# Patient Record
Sex: FEMALE | Race: WHITE | NOT HISPANIC OR LATINO | Employment: OTHER | URBAN - METROPOLITAN AREA
[De-identification: names, ages, dates, MRNs, and addresses within clinical notes are randomized per-mention and may not be internally consistent; named-entity substitution may affect disease eponyms.]

---

## 2024-03-19 ENCOUNTER — HOSPITAL ENCOUNTER (OUTPATIENT)
Facility: MEDICAL CENTER | Age: 84
End: 2024-03-21
Attending: STUDENT IN AN ORGANIZED HEALTH CARE EDUCATION/TRAINING PROGRAM | Admitting: STUDENT IN AN ORGANIZED HEALTH CARE EDUCATION/TRAINING PROGRAM
Payer: MEDICARE

## 2024-03-19 ENCOUNTER — HOSPITAL ENCOUNTER (OUTPATIENT)
Dept: RADIOLOGY | Facility: MEDICAL CENTER | Age: 84
End: 2024-03-19
Payer: MEDICARE

## 2024-03-19 DIAGNOSIS — R47.01 EXPRESSIVE APHASIA: ICD-10-CM

## 2024-03-19 DIAGNOSIS — Z95.0 PACEMAKER: ICD-10-CM

## 2024-03-19 DIAGNOSIS — Z86.59 HISTORY OF DEMENTIA: ICD-10-CM

## 2024-03-19 DIAGNOSIS — I15.9 SECONDARY HYPERTENSION: ICD-10-CM

## 2024-03-19 DIAGNOSIS — I16.0 HYPERTENSIVE URGENCY: ICD-10-CM

## 2024-03-19 LAB — EKG IMPRESSION: NORMAL

## 2024-03-19 PROCEDURE — 85027 COMPLETE CBC AUTOMATED: CPT

## 2024-03-19 PROCEDURE — 36415 COLL VENOUS BLD VENIPUNCTURE: CPT

## 2024-03-19 PROCEDURE — 85007 BL SMEAR W/DIFF WBC COUNT: CPT

## 2024-03-19 PROCEDURE — 93005 ELECTROCARDIOGRAM TRACING: CPT | Performed by: STUDENT IN AN ORGANIZED HEALTH CARE EDUCATION/TRAINING PROGRAM

## 2024-03-19 PROCEDURE — 84443 ASSAY THYROID STIM HORMONE: CPT

## 2024-03-19 PROCEDURE — 99285 EMERGENCY DEPT VISIT HI MDM: CPT

## 2024-03-19 PROCEDURE — 84439 ASSAY OF FREE THYROXINE: CPT

## 2024-03-19 PROCEDURE — 80053 COMPREHEN METABOLIC PANEL: CPT

## 2024-03-19 PROCEDURE — 83036 HEMOGLOBIN GLYCOSYLATED A1C: CPT

## 2024-03-19 ASSESSMENT — PAIN DESCRIPTION - PAIN TYPE
TYPE: ACUTE PAIN
TYPE: ACUTE PAIN

## 2024-03-20 PROBLEM — I10 PRIMARY HYPERTENSION: Status: ACTIVE | Noted: 2024-03-20

## 2024-03-20 PROBLEM — E78.5 DYSLIPIDEMIA: Status: ACTIVE | Noted: 2024-03-20

## 2024-03-20 PROBLEM — R47.01 EXPRESSIVE APHASIA: Status: ACTIVE | Noted: 2024-03-20

## 2024-03-20 PROBLEM — I16.0 HYPERTENSIVE URGENCY: Status: ACTIVE | Noted: 2024-03-20

## 2024-03-20 PROBLEM — E03.9 ACQUIRED HYPOTHYROIDISM: Status: ACTIVE | Noted: 2024-03-20

## 2024-03-20 LAB
ALBUMIN SERPL BCP-MCNC: 3.6 G/DL (ref 3.2–4.9)
ALBUMIN/GLOB SERPL: 1.1 G/DL
ALP SERPL-CCNC: 174 U/L (ref 30–99)
ALT SERPL-CCNC: 14 U/L (ref 2–50)
ANION GAP SERPL CALC-SCNC: 12 MMOL/L (ref 7–16)
AST SERPL-CCNC: 20 U/L (ref 12–45)
BASOPHILS # BLD AUTO: 0 % (ref 0–1.8)
BASOPHILS # BLD: 0 K/UL (ref 0–0.12)
BILIRUB SERPL-MCNC: 0.4 MG/DL (ref 0.1–1.5)
BUN SERPL-MCNC: 24 MG/DL (ref 8–22)
CALCIUM ALBUM COR SERPL-MCNC: 9.8 MG/DL (ref 8.5–10.5)
CALCIUM SERPL-MCNC: 9.5 MG/DL (ref 8.5–10.5)
CHLORIDE SERPL-SCNC: 106 MMOL/L (ref 96–112)
CO2 SERPL-SCNC: 23 MMOL/L (ref 20–33)
CREAT SERPL-MCNC: 0.92 MG/DL (ref 0.5–1.4)
EOSINOPHIL # BLD AUTO: 0.49 K/UL (ref 0–0.51)
EOSINOPHIL NFR BLD: 4.5 % (ref 0–6.9)
ERYTHROCYTE [DISTWIDTH] IN BLOOD BY AUTOMATED COUNT: 48 FL (ref 35.9–50)
EST. AVERAGE GLUCOSE BLD GHB EST-MCNC: 131 MG/DL
GFR SERPLBLD CREATININE-BSD FMLA CKD-EPI: 61 ML/MIN/1.73 M 2
GLOBULIN SER CALC-MCNC: 3.2 G/DL (ref 1.9–3.5)
GLUCOSE BLD STRIP.AUTO-MCNC: 102 MG/DL (ref 65–99)
GLUCOSE SERPL-MCNC: 98 MG/DL (ref 65–99)
HBA1C MFR BLD: 6.2 % (ref 4–5.6)
HCT VFR BLD AUTO: 38.6 % (ref 37–47)
HGB BLD-MCNC: 12.9 G/DL (ref 12–16)
LYMPHOCYTES # BLD AUTO: 2.63 K/UL (ref 1–4.8)
LYMPHOCYTES NFR BLD: 24.1 % (ref 22–41)
MANUAL DIFF BLD: NORMAL
MCH RBC QN AUTO: 32.3 PG (ref 27–33)
MCHC RBC AUTO-ENTMCNC: 33.4 G/DL (ref 32.2–35.5)
MCV RBC AUTO: 96.7 FL (ref 81.4–97.8)
MICROCYTES BLD QL SMEAR: ABNORMAL
MONOCYTES # BLD AUTO: 0.49 K/UL (ref 0–0.85)
MONOCYTES NFR BLD AUTO: 4.5 % (ref 0–13.4)
MORPHOLOGY BLD-IMP: NORMAL
NEUTROPHILS # BLD AUTO: 7.29 K/UL (ref 1.82–7.42)
NEUTROPHILS NFR BLD: 66.9 % (ref 44–72)
NRBC # BLD AUTO: 0 K/UL
NRBC BLD-RTO: 0 /100 WBC (ref 0–0.2)
PLATELET # BLD AUTO: 188 K/UL (ref 164–446)
PLATELET BLD QL SMEAR: NORMAL
PMV BLD AUTO: 11.2 FL (ref 9–12.9)
POTASSIUM SERPL-SCNC: 4 MMOL/L (ref 3.6–5.5)
PROT SERPL-MCNC: 6.8 G/DL (ref 6–8.2)
RBC # BLD AUTO: 3.99 M/UL (ref 4.2–5.4)
RBC BLD AUTO: PRESENT
SODIUM SERPL-SCNC: 141 MMOL/L (ref 135–145)
T4 FREE SERPL-MCNC: 1.51 NG/DL (ref 0.93–1.7)
TSH SERPL DL<=0.005 MIU/L-ACNC: 0.34 UIU/ML (ref 0.38–5.33)
WBC # BLD AUTO: 10.9 K/UL (ref 4.8–10.8)

## 2024-03-20 PROCEDURE — 96376 TX/PRO/DX INJ SAME DRUG ADON: CPT

## 2024-03-20 PROCEDURE — 96375 TX/PRO/DX INJ NEW DRUG ADDON: CPT

## 2024-03-20 PROCEDURE — 700102 HCHG RX REV CODE 250 W/ 637 OVERRIDE(OP): Performed by: STUDENT IN AN ORGANIZED HEALTH CARE EDUCATION/TRAINING PROGRAM

## 2024-03-20 PROCEDURE — 700102 HCHG RX REV CODE 250 W/ 637 OVERRIDE(OP)

## 2024-03-20 PROCEDURE — G0378 HOSPITAL OBSERVATION PER HR: HCPCS

## 2024-03-20 PROCEDURE — 82962 GLUCOSE BLOOD TEST: CPT

## 2024-03-20 PROCEDURE — 302118 SHAMPOO,NO RINSE: Performed by: HOSPITALIST

## 2024-03-20 PROCEDURE — 92610 EVALUATE SWALLOWING FUNCTION: CPT

## 2024-03-20 PROCEDURE — A9270 NON-COVERED ITEM OR SERVICE: HCPCS | Performed by: STUDENT IN AN ORGANIZED HEALTH CARE EDUCATION/TRAINING PROGRAM

## 2024-03-20 PROCEDURE — 95816 EEG AWAKE AND DROWSY: CPT | Mod: 26 | Performed by: PSYCHIATRY & NEUROLOGY

## 2024-03-20 PROCEDURE — 95816 EEG AWAKE AND DROWSY: CPT | Performed by: PSYCHIATRY & NEUROLOGY

## 2024-03-20 PROCEDURE — 96374 THER/PROPH/DIAG INJ IV PUSH: CPT

## 2024-03-20 PROCEDURE — 99223 1ST HOSP IP/OBS HIGH 75: CPT | Performed by: STUDENT IN AN ORGANIZED HEALTH CARE EDUCATION/TRAINING PROGRAM

## 2024-03-20 PROCEDURE — A9270 NON-COVERED ITEM OR SERVICE: HCPCS

## 2024-03-20 PROCEDURE — 700111 HCHG RX REV CODE 636 W/ 250 OVERRIDE (IP): Performed by: STUDENT IN AN ORGANIZED HEALTH CARE EDUCATION/TRAINING PROGRAM

## 2024-03-20 RX ORDER — ATORVASTATIN CALCIUM 20 MG/1
20 TABLET, FILM COATED ORAL DAILY
COMMUNITY

## 2024-03-20 RX ORDER — CARVEDILOL 3.12 MG/1
3.12 TABLET ORAL 2 TIMES DAILY WITH MEALS
Status: ON HOLD | COMMUNITY
End: 2024-03-21

## 2024-03-20 RX ORDER — LEVOTHYROXINE SODIUM 0.1 MG/1
100 TABLET ORAL
Status: DISCONTINUED | OUTPATIENT
Start: 2024-03-20 | End: 2024-03-21 | Stop reason: HOSPADM

## 2024-03-20 RX ORDER — NYSTATIN 100000 [USP'U]/G
1 POWDER TOPICAL 3 TIMES DAILY
COMMUNITY

## 2024-03-20 RX ORDER — LEVOTHYROXINE SODIUM 0.1 MG/1
100 TABLET ORAL
COMMUNITY

## 2024-03-20 RX ORDER — LEVOFLOXACIN 500 MG/1
250 TABLET, FILM COATED ORAL EVERY EVENING
Status: DISCONTINUED | OUTPATIENT
Start: 2024-03-20 | End: 2024-03-21 | Stop reason: HOSPADM

## 2024-03-20 RX ORDER — HYDRALAZINE HYDROCHLORIDE 20 MG/ML
10 INJECTION INTRAMUSCULAR; INTRAVENOUS
Status: COMPLETED | OUTPATIENT
Start: 2024-03-20 | End: 2024-03-20

## 2024-03-20 RX ORDER — PREGABALIN 75 MG/1
75 CAPSULE ORAL 3 TIMES DAILY
COMMUNITY

## 2024-03-20 RX ORDER — OXYBUTYNIN CHLORIDE 5 MG/1
10 TABLET ORAL DAILY
COMMUNITY

## 2024-03-20 RX ORDER — ASPIRIN 300 MG/1
300 SUPPOSITORY RECTAL DAILY
Status: DISCONTINUED | OUTPATIENT
Start: 2024-03-20 | End: 2024-03-21 | Stop reason: HOSPADM

## 2024-03-20 RX ORDER — PANTOPRAZOLE SODIUM 40 MG/1
40 TABLET, DELAYED RELEASE ORAL DAILY
COMMUNITY

## 2024-03-20 RX ORDER — LEVOFLOXACIN 500 MG/1
250 TABLET, FILM COATED ORAL DAILY
COMMUNITY

## 2024-03-20 RX ORDER — ISOSORBIDE MONONITRATE 30 MG/1
30 TABLET, EXTENDED RELEASE ORAL EVERY MORNING
COMMUNITY

## 2024-03-20 RX ORDER — CARVEDILOL 6.25 MG/1
3.12 TABLET ORAL 2 TIMES DAILY WITH MEALS
Status: DISCONTINUED | OUTPATIENT
Start: 2024-03-20 | End: 2024-03-21 | Stop reason: HOSPADM

## 2024-03-20 RX ORDER — ATORVASTATIN CALCIUM 40 MG/1
40 TABLET, FILM COATED ORAL EVERY EVENING
Status: DISCONTINUED | OUTPATIENT
Start: 2024-03-20 | End: 2024-03-21 | Stop reason: HOSPADM

## 2024-03-20 RX ORDER — TRAMADOL HYDROCHLORIDE 50 MG/1
50 TABLET ORAL EVERY 8 HOURS PRN
COMMUNITY

## 2024-03-20 RX ORDER — OMEPRAZOLE 20 MG/1
20 CAPSULE, DELAYED RELEASE ORAL DAILY
Status: DISCONTINUED | OUTPATIENT
Start: 2024-03-20 | End: 2024-03-21 | Stop reason: HOSPADM

## 2024-03-20 RX ORDER — ASPIRIN 81 MG/1
81 TABLET, CHEWABLE ORAL DAILY
COMMUNITY

## 2024-03-20 RX ORDER — NITROGLYCERIN 0.4 MG/1
0.4 TABLET SUBLINGUAL
COMMUNITY

## 2024-03-20 RX ORDER — SODIUM CHLORIDE 9 MG/ML
500 INJECTION, SOLUTION INTRAVENOUS
Status: DISCONTINUED | OUTPATIENT
Start: 2024-03-20 | End: 2024-03-21 | Stop reason: HOSPADM

## 2024-03-20 RX ORDER — LABETALOL HYDROCHLORIDE 5 MG/ML
10 INJECTION, SOLUTION INTRAVENOUS
Status: COMPLETED | OUTPATIENT
Start: 2024-03-20 | End: 2024-03-20

## 2024-03-20 RX ORDER — ASPIRIN 81 MG/1
81 TABLET, CHEWABLE ORAL DAILY
Status: DISCONTINUED | OUTPATIENT
Start: 2024-03-20 | End: 2024-03-21 | Stop reason: HOSPADM

## 2024-03-20 RX ORDER — HALOPERIDOL 5 MG/ML
1 INJECTION INTRAMUSCULAR EVERY 4 HOURS PRN
Status: DISCONTINUED | OUTPATIENT
Start: 2024-03-20 | End: 2024-03-21 | Stop reason: HOSPADM

## 2024-03-20 RX ORDER — PREGABALIN 75 MG/1
75 CAPSULE ORAL 3 TIMES DAILY
Status: DISCONTINUED | OUTPATIENT
Start: 2024-03-20 | End: 2024-03-21 | Stop reason: HOSPADM

## 2024-03-20 RX ADMIN — HALOPERIDOL LACTATE 1 MG: 5 INJECTION, SOLUTION INTRAMUSCULAR at 03:52

## 2024-03-20 RX ADMIN — ATORVASTATIN CALCIUM 40 MG: 40 TABLET, FILM COATED ORAL at 18:14

## 2024-03-20 RX ADMIN — PREGABALIN 75 MG: 75 CAPSULE ORAL at 12:05

## 2024-03-20 RX ADMIN — ASPIRIN 300 MG: 300 SUPPOSITORY RECTAL at 06:26

## 2024-03-20 RX ADMIN — PREGABALIN 75 MG: 75 CAPSULE ORAL at 18:09

## 2024-03-20 RX ADMIN — LEVOFLOXACIN 250 MG: 500 TABLET, FILM COATED ORAL at 18:13

## 2024-03-20 RX ADMIN — LABETALOL HYDROCHLORIDE 10 MG: 5 INJECTION INTRAVENOUS at 16:50

## 2024-03-20 RX ADMIN — HYDRALAZINE HYDROCHLORIDE 10 MG: 20 INJECTION, SOLUTION INTRAMUSCULAR; INTRAVENOUS at 02:25

## 2024-03-20 RX ADMIN — CARVEDILOL 3.12 MG: 6.25 TABLET, FILM COATED ORAL at 18:11

## 2024-03-20 RX ADMIN — LABETALOL HYDROCHLORIDE 10 MG: 5 INJECTION INTRAVENOUS at 04:00

## 2024-03-20 ASSESSMENT — LIFESTYLE VARIABLES
TOTAL SCORE: 0
EVER HAD A DRINK FIRST THING IN THE MORNING TO STEADY YOUR NERVES TO GET RID OF A HANGOVER: NO
ALCOHOL_USE: NO
HAVE YOU EVER FELT YOU SHOULD CUT DOWN ON YOUR DRINKING: NO
CONSUMPTION TOTAL: NEGATIVE
HAVE PEOPLE ANNOYED YOU BY CRITICIZING YOUR DRINKING: NO
ON A TYPICAL DAY WHEN YOU DRINK ALCOHOL HOW MANY DRINKS DO YOU HAVE: 0
DOES PATIENT WANT TO STOP DRINKING: NO
AVERAGE NUMBER OF DAYS PER WEEK YOU HAVE A DRINK CONTAINING ALCOHOL: 0
EVER FELT BAD OR GUILTY ABOUT YOUR DRINKING: NO
HOW MANY TIMES IN THE PAST YEAR HAVE YOU HAD 5 OR MORE DRINKS IN A DAY: 0

## 2024-03-20 ASSESSMENT — PATIENT HEALTH QUESTIONNAIRE - PHQ9
1. LITTLE INTEREST OR PLEASURE IN DOING THINGS: NOT AT ALL
2. FEELING DOWN, DEPRESSED, IRRITABLE, OR HOPELESS: NOT AT ALL
SUM OF ALL RESPONSES TO PHQ9 QUESTIONS 1 AND 2: 0

## 2024-03-20 ASSESSMENT — FIBROSIS 4 INDEX
FIB4 SCORE: 2.39
FIB4 SCORE: 2.39

## 2024-03-20 ASSESSMENT — PAIN DESCRIPTION - PAIN TYPE
TYPE: ACUTE PAIN
TYPE: ACUTE PAIN

## 2024-03-20 NOTE — PROGRESS NOTES
Brief neurology note      84-year-old female with known dementia and nursing home.  Was transferred in outside facility.  Patient had abrupt onset of confusion not responding.  They labeled as expressive aphasia.  However as stated in the ERP's note this has happened multiple times per family.  Therefore differential is broad.  I did not recommend a full stroke workup.  I did recommend may proceed with MRI to evaluate these episodes.  If it does show acute stroke vascular team will do follow-up neurological consult.  If not then general neurology will follow-up with the patient.

## 2024-03-20 NOTE — ED PROVIDER NOTES
CHIEF COMPLAINT  Chief Complaint   Patient presents with    Other     AMS       LIMITATION TO HISTORY   Select:     YANCY Galvan is a 84 y.o. female who presents to the Emergency Department as a transfer from [facility] emergency department for evaluation of episode of aphasia that occurred at her nursing facility patient noted to be aphasic in the emergency department she is recently diagnosed and being treated for urinary tract infection with Macrobid diagnosed 2 days ago at [facility] patient has history of significant dementia family at bedside they report that at this moment she appears to be at her baseline and that her speech seems clear and fluent there would not with her earlier today, symptoms began at 230 neurologist was consulted    OUTSIDE HISTORIAN(S):  Select:    EXTERNAL RECORDS REVIEWED  Select:                 PAST MEDICAL HISTORY  No past medical history on file.  .    SURGICAL HISTORY  No past surgical history on file.      FAMILY HISTORY  No family history on file.       SOCIAL HISTORY  Social History     Socioeconomic History    Marital status:      Spouse name: Not on file    Number of children: Not on file    Years of education: Not on file    Highest education level: Not on file   Occupational History    Not on file   Tobacco Use    Smoking status: Not on file    Smokeless tobacco: Not on file   Substance and Sexual Activity    Alcohol use: Not on file    Drug use: Not on file    Sexual activity: Not on file   Other Topics Concern    Not on file   Social History Narrative    Not on file     Social Determinants of Health     Financial Resource Strain: Not on file   Food Insecurity: Not on file   Transportation Needs: Not on file   Physical Activity: Not on file   Stress: Not on file   Social Connections: Not on file   Intimate Partner Violence: Not on file   Housing Stability: Not on file         CURRENT MEDICATIONS  No current facility-administered medications on file  "prior to encounter.     No current outpatient medications on file prior to encounter.           ALLERGIES  No Known Allergies    PHYSICAL EXAM  VITAL SIGNS:BP (!) 195/81   Pulse 75   Temp 36.6 °C (97.8 °F) (Temporal)   Resp 18   Ht 1.676 m (5' 6\")   Wt 81.2 kg (179 lb)   SpO2 94%   BMI 28.89 kg/m²       GENERAL: Awake and alert  HEAD: Normocephalic and atraumatic  NECK: Normal range of motion, without meningismus  EYES: Pupils Equal, Round, Reactive to Light, extraocular movements intact, conjunctiva white  ENT: Mucous membranes moist, oropharynx clear  PULMONARY: Normal effort, clear to auscultation  CARDIOVASCULAR: No murmurs, clicks or rubs, peripheral pulses 2+  ABDOMINAL: Soft, non-tender, no guarding or rigidity present, no pulsatile masses  BACK: no midline tenderness, no costovertebral tenderness  NEUROLOGICAL: Alert, oriented to person but not place or time, Cranial nerves II-XII intact, strength 5/5 throughout, sensation intact to light touch throughout, speech normal, gait normal, DTR 1+ lower extremities, no extremity dysmetria  EXTREMITIES: No edema, normal to inspection  SKIN: Warm and dry.  PSYCHIATRIC: Affect is appropriate    DIAGNOSTIC STUDIES / PROCEDURES  EKG  EKG Interpretation: I independently reviewed the below EKG and did not see signs of a STEMI  Results for orders placed or performed during the hospital encounter of 24   EKG   Result Value Ref Range    Report       [facility] Emergency Dept.    Test Date:  2024  Pt Name:    AUGUSTIN KNIGHT             Department: ER  MRN:        [id number]                      Room:        15  Gender:     Female                       Technician: [id number]  :        [id number]                   Requested By:GHAZAL ALBERT  Order #:    [id number]                    Reading MD:    Measurements  Intervals                                Axis  Rate:       77                           P:          0  NE:         218             "              QRS:        28  QRSD:       78                           T:          11  QT:         393  QTc:        445    Interpretive Statements  Atrial-ventricular dual-paced complexes  No further rhythm analysis attempted due to paced rhythm  Borderline prolonged NH interval  No previous ECG available for comparison         Cardiac Monitor Interpretation:    Time: 12:40 AM  The cardiac monitor revealed normal sinus rhythm as interpreted by me.  The cardiac monitor was ordered secondary to the patient´s history of CVA/aphasia to monitor the patient for dysrhythmia      LABS  Labs Reviewed   CBC WITH DIFFERENTIAL - Abnormal; Notable for the following components:       Result Value    WBC 10.9 (*)     RBC 3.99 (*)     All other components within normal limits   COMP METABOLIC PANEL - Abnormal; Notable for the following components:    Bun 24 (*)     Alkaline Phosphatase 174 (*)     All other components within normal limits   HEMOGLOBIN A1C - Abnormal; Notable for the following components:    Glycohemoglobin 6.2 (*)     All other components within normal limits   ESTIMATED GFR   DIFFERENTIAL MANUAL   PERIPHERAL SMEAR REVIEW   PLATELET ESTIMATE   MORPHOLOGY         RADIOLOGY  Reviewed CT of the brain obtained at outside facility no intercranial hemorrhage    COURSE & MEDICAL DECISION MAKING    ED COURSE:        INTERVENTIONS BY ME:  Medications   Blood pressure control per admin instructions (has no administration in time range)   labetalol (Normodyne/Trandate) injection 10 mg (has no administration in time range)   hydrALAZINE (Apresoline) injection 10 mg (has no administration in time range)   NS (Bolus) 0.9 % infusion 500 mL (has no administration in time range)   atorvastatin (Lipitor) tablet 40 mg (has no administration in time range)   aspirin (Asa) chewable tab 81 mg (has no administration in time range)     Or   aspirin (Asa) suppository 300 mg (has no administration in time range)       Response on  recheck:      INITIAL ASSESSMENT, COURSE AND PLAN  Care Narrative:     Patient presenting as a transfer evaluation of strokelike symptoms that appear to have resolved patient has history of dementia family including her daughter at bedside reports she is at her neurologic baseline at this time.  She is disoriented but otherwise has no focal neurologic deficits on examination does not exhibit expressive aphasia at this time.  Patient mated to hospitalist for further workup and care.  CBC and CMP are without significant abnormality patient with reassuring vital signs.  Did consider obtaining CTA and CT perfusion of the head and neck given her strokelike symptoms however given her symptoms appear to resolve do not feel the patient has a large vessel occlusion         ADDITIONAL PROBLEM LIST    DISPOSITION AND DISCUSSIONS  Discussion of management with other [facility] or appropriate source(s): None     I have discussed management of the patient with the following physicians and XIMENA's: Spoke to stroke neurologist Dr. Alegre who did not recommend any acute intervention at this time.  I disccused the mangament and decision to admit this patient with the Hospitalist. Dr. Armendariz        FINAL DIAGNOSIS  1. Expressive aphasia    2. Secondary hypertension    3. History of dementia             Electronically signed by: Serge Mcneill DO ,1:47 AM 03/20/24

## 2024-03-20 NOTE — PROGRESS NOTES
Pt remained non-verbal for the duration of our assessment. Pt is unable to be evaluated for orientation or speech aphasia at this time. Pt awake and following motion with eye movement. Physician rounded and was unable to converse with pt.

## 2024-03-20 NOTE — PROGRESS NOTES
Monitor summary: SR 72, MA 0.17, QRS 0.06, QT 0.36, per strip from monitor room.          0300 Patient refusing/continuously removing telemetry monitor

## 2024-03-20 NOTE — PROGRESS NOTES
Family contact, Ana Sanders contacted and stated she will be here before 9:30 to help fill out the MRI screening form, Med Rec., and answer baseline questions.

## 2024-03-20 NOTE — ED TRIAGE NOTES
".  Chief Complaint   Patient presents with    Other     AMS       84 yr patient AARON MATHUR from [facility] after evaluation for a stroke to [facility] for above complaint. PER EMS patient was saw at [place] and alll results were normal. And when asked why the transfer they were told because they could not find anything. Patient has hx of dementia      Pt placed [facility]   Patient and staff wearing appropriate PPE    BP (!) 203/90   Pulse 62   Temp 36.6 °C (97.8 °F) (Temporal)   Resp 18   Ht 1.676 m (5' 6\")   Wt 81.2 kg (179 lb)   SpO2 93%   BMI 28.89 kg/m²     "

## 2024-03-20 NOTE — ASSESSMENT & PLAN NOTE
Systolic BP in the ED up to 200s on presentation.  Continue home carvedilol, as needed antihypertensives ordered  Less likely to be the cause of her neurologic symptoms as her symptoms resolved despite her pressure remaining elevated

## 2024-03-20 NOTE — ASSESSMENT & PLAN NOTE
Expressive aphasia and altered mental status had resolved by the time she arrived to this facility  CT head at outside facility reviewed and negative for acute abnormality  MRI brain ordered. Stroke order set utilized  EDP discussed with neurology recommended stroke workup

## 2024-03-20 NOTE — H&P
Hospital Medicine History & Physical Note    Date of Service  3/20/2024    Primary Care Physician  Pcp Pt States None    Code Status  DNAR/DNI    Chief Complaint  Chief Complaint   Patient presents with    Other     AMS       History of Presenting Illness  Inga Galvan is a 84 y.o. female who was transferred from outside facility 3/19/2024 with expressive aphasia.  History of per chart review as patient has advanced dementia.  Patient sent in to outside facility ED due to expressive aphasia, reportedly per family she is AO x 2-3 and is conversant.  She also was not following commands appropriately.  No history of stroke in the past.  Reportedly her exacerbation is already resolving prior to transfer, on arrival to the this facility family reported her symptoms had resolved.  CT head at outside facility reviewed and shows no acute abnormality.  In the ED afebrile, BP significantly elevated in the 200s systolic.    I discussed the plan of care with patient, bedside RN, and EDP .    Review of Systems  Review of Systems   Unable to perform ROS: Dementia     Past Medical History  Dementia, dyslipidemia, hypertension, hypothyroidism, GERD    Surgical History   has no past surgical history on file.     Family History  Family history reviewed with patient. There is no family history that is pertinent to the chief complaint.     Social History   No current smoking, alcohol use, drug use    Allergies  No Known Allergies    Medications  None       Physical Exam  Temp:  [36.6 °C (97.8 °F)] 36.6 °C (97.8 °F)  Pulse:  [62-75] 75  Resp:  [18] 18  BP: (195-203)/(81-90) 195/81  SpO2:  [93 %-94 %] 94 %  Blood Pressure : (!) 195/81   Temperature: 36.6 °C (97.8 °F)   Pulse: 75   Respiration: 18   Pulse Oximetry: 94 %       Physical Exam  Constitutional:       Appearance: Normal appearance.   HENT:      Head: Normocephalic and atraumatic.      Mouth/Throat:      Mouth: Mucous membranes are moist.      Pharynx: No oropharyngeal  "exudate or posterior oropharyngeal erythema.   Cardiovascular:      Rate and Rhythm: Normal rate and regular rhythm.      Pulses: Normal pulses.      Heart sounds: Normal heart sounds. No murmur heard.  Pulmonary:      Effort: Pulmonary effort is normal. No respiratory distress.      Breath sounds: Normal breath sounds.   Musculoskeletal:         General: No swelling or tenderness. Normal range of motion.   Skin:     General: Skin is warm and dry.   Neurological:      General: No focal deficit present.      Mental Status: She is alert.      Cranial Nerves: No cranial nerve deficit.      Sensory: No sensory deficit.      Comments: Oriented to person and place  Moving all 4 extremities when I walked into the room, then refused to participate in exam         Laboratory:  Recent Labs     03/19/24  2307   WBC 10.9*   RBC 3.99*   HEMOGLOBIN 12.9   HEMATOCRIT 38.6   MCV 96.7   MCH 32.3   MCHC 33.4   RDW 48.0   PLATELETCT 188   MPV 11.2     Recent Labs     03/19/24  2307   SODIUM 141   POTASSIUM 4.0   CHLORIDE 106   CO2 23   GLUCOSE 98   BUN 24*   CREATININE 0.92   CALCIUM 9.5     Recent Labs     03/19/24  2307   ALTSGPT 14   ASTSGOT 20   ALKPHOSPHAT 174*   TBILIRUBIN 0.4   GLUCOSE 98         No results for input(s): \"NTPROBNP\" in the last 72 hours.      No results for input(s): \"TROPONINT\" in the last 72 hours.    Imaging:  MR-BRAIN-W/O    (Results Pending)       EKG:  I have personally reviewed the images and compared with prior images. and My impression is: NSR    Assessment/Plan:  Justification for Admission Status  I anticipate this patient is appropriate for observation status at this time because expressive aphasia, resolved    * Expressive aphasia- (present on admission)  Assessment & Plan  Expressive aphasia and altered mental status had resolved by the time she arrived to this facility  CT head at outside facility reviewed and negative for acute abnormality  MRI brain ordered. Stroke order set utilized  EDP " discussed with neurology recommended stroke workup    Hypertensive urgency- (present on admission)  Assessment & Plan  Systolic BP in the ED up to 200s on presentation.  Continue home carvedilol, as needed antihypertensives ordered  Less likely to be the cause of her neurologic symptoms as her symptoms resolved despite her pressure remaining elevated    Dyslipidemia- (present on admission)  Assessment & Plan  Continue statin, aspirin    Acquired hypothyroidism- (present on admission)  Assessment & Plan  Continue levothyroxine.  Thyroid labs ordered    Primary hypertension- (present on admission)  Assessment & Plan  Continue carvedilol        VTE prophylaxis: SCDs/TEDs

## 2024-03-20 NOTE — PROGRESS NOTES
Patient just admitted    Patient is awake    Advanced dementia, follows commands relatively    Pacer not compatible with mri    Rv lead not capturing/functioning..outpatient f/u with cards.. already made    Would recommend eeg now and ct head at 48 hr mahad    If MRI of the brain is positive for stroke , which will receive clearance from [facility] reconsult neurology

## 2024-03-20 NOTE — THERAPY
Speech Language Pathology   Clinical Swallow Evaluation     Patient Name: Inga Galvan  AGE:  84 y.o., SEX:  female  Medical Record #: [id number]  Date of Service: 3/20/2024      History of Present Illness  The pt is an 83 y/o F who was transferred from [facility] and admitted under observation 3/19/24. The pt presented with AMS and expressive aphasia. Not previously seen by SLP at [facility].     PMHx: dementia    CMHx: hypertensive, hypothyroidism    CT Head [facility] 3/19: No CT evidence of acute intracranial hemorrhage     Pending MRI        General Information:  Vitals  O2 Delivery Device: None - Room Air  Level of Consciousness: Alert, Awake  Orientation: Self, , General place  Follows Directives: Yes - simple commands only      Prior Living Situation & Level of Function:  Prior Services: Intermittent Physical Support for ADL Per Service ([facility])  Housing / Facility: Assisted Living Residence  Lives with - Patient's Self Care Capacity: Attendant / Paid Care Giver  Comments: Per family, pt lives in [facility]. She is not mobile but uses a motorized wheelchair to get around.  Communication: Conversational with known memory deficits d/t dementia. Pt's DIL reported occasional episodes of aphasia and increased confusion r/t UTI.  Swallowing: Pt's son reported that pt consumes softer solids that are cut for her, has difficulty with bread. He denied any known issues with thin liquids; however pt endorsed occasional couging with thin liquids. No concerns with medication administration nor unexpected weight loss, does not drink supplements typically.       Oral Mechanism Evaluation:  Dentition: Full dentures   Facial Symmetry: Equal  Facial Sensation: Equal     Labial Observations: WFL   Lingual Observations: Midline  Motor Speech: WFL          Laryngeal Function:  Secretion Management: Adequate  Voice Quality: Hoarse  Cough: Perceptually weak       Subjective  RN cleared pt for speech tx, no acute changes reported. Pt was awake  "and alert. She was plesant and cooperative. Pt's son and DIL present.      Assessment  Current Method of Nutrition: NPO until cleared by speech pathology  Positioning: Toro's (60-90 degrees)  Bolus Administration: SLP  O2 Delivery Device: None - Room Air  Factor(s) Affecting Performance: None       Swallowing Trials:  Swallowing Trials  Ice: WFL  Thin Liquid (TN0): Impaired - cup, straw  Liquidised (LQ3): WFL  Soft & Bite Sized (SB6): WFL  Regular (RG7): WFL      Comments: SLP administered trials per patient request, stating that she was \"cold\" and wished to remain under her sheets. Appropriate oral acceptance and sufficient containment. Adequate bite. Mastication time of ~13 sec with diced pears and ~23 sec with dinora crackers. Presumed adequate oral manipulation and bolus propulsion. Complete oral clearance. Intermittent subjectively weak cough with thin liquids via cup. Pt reported dinora cracker was \"dry\" and requested water to assist with clearance. This resulted in a subjectively weak cough of thin liquids via straw sip. Vocal quality was intermittently, minimally wet but cleared spontaneously.       Clinical Impressions  The pt presents with clinical signs of dysphagia c/b intermittently weak cough with thin liquids and minimally wet vocal quality, though vocal quality cleared spontaneously. Findings appear likely chronic d/t presbyphagia atop known dementia given patient/family report; however may also be impacted by AMS, which appears to be resolving. Discussed POC options with family, including completing a diagnostic study to fully assess swallow physiology and aspiration risk. Patient/family made an informed decision to forgo additional swallowing workup at this time to best align with GOC and maintain QOL measures. Although aspiration cannot be r/o at bedside, the risk of developing a pulmonary complication from oral intake appears relatively low given the pt's lack of oxygen support, good oral " hygiene and ability to transfer out of bed into her wheelchair.       Recommendations  Diet Consistency: Soft/bite sized (SB6), Thin liquids (TN0) - ok advance solids per patient/family request  Instrumentation: None indicated at this time  Medication: Whole with liquid, As tolerated  Supervision: Assist with meal tray set up  Positioning: Fully upright and midline during oral intake  Risk Management : Small bites/sips  Oral Care: BID      Results and recs d/w pt, family and RN. Please reconsult should the patient/family wish to pursue additional swallowing workup. Thank you.         SLP Treatment Plan  Treatment Plan: None Indicated  SLP Frequency: N/A - Evaluation Only  Estimated Duration: N/A - Evaluation Only    Anticipated Discharge Needs  Therapy Recommendations Upon DC: Patient / Family / Caregiver Education        Carole Frazier, SLP

## 2024-03-20 NOTE — PROGRESS NOTES
Pt A&O x 2. NIH stroke scale completed and pt scored 2. Pt was cooperative and was able to follow all commands. Pt states no pain but is cold. Pt family not at bedside, we are pending daughter-in-law arrival for MRI screening forms and admission profile.

## 2024-03-20 NOTE — PROCEDURES
VIDEO ELECTROENCEPHALOGRAM REPORT      Referring provider: Dr. Dash    DOS: 03/20/24 (total recording of 27 minutes).     INDICATION:  Inga Galvan 84 y.o. female presenting with confusion and aphasia     CURRENT ANTIEPILEPTIC REGIMEN: none     TECHNIQUE: 30 channel video electroencephalogram (EEG) was performed in accordance with the international 10-20 system. The study was reviewed in bipolar and referential montages. The recording examined the patient during   awake and drowsy states    DESCRIPTION OF THE RECORD:  During the wakefulness, the background showed a symmetrical 8 Hz alpha activity posteriorly with amplitude of 70 mV.  There was reactivity to eye closure/opening.  A normal anterior-posterior gradient was noted with faster beta frequencies seen anteriorly.  During drowsiness, increased theta/beta frequencies were seen.    ACTIVATION PROCEDURES:     hyperventilation was not performed    Intermittent Photic stimulation was performed in a stepwise fashion from 1 to 30 Hz and elicited no photic driving response.     ICTAL AND/OR INTERICTAL FINDINGS:   No focal or generalized epileptiform activity noted. No regional slowing was seen during this routine study.  No clinical events or seizures were reported or recorded during the study.     EKG: sampling of the EKG recording demonstrated paced rhythm.     EVENTS: none     INTERPRETATION:    This is a normal video EEG recording in the awake and drowsy states for patient's age group.     Note: A normal EEG does not rule out epilepsy.       Yordy Donaldson MD  Diplomate in Neurology&Epilepsy  Office: [phone]  Fax: [phone]

## 2024-03-20 NOTE — DISCHARGE PLANNING
Advanced  directive faxed to [facility]. Message sent to [facility] with request to scan to Media tab.

## 2024-03-20 NOTE — PROGRESS NOTES
4 Eyes Skin Assessment Completed by MAI Villa and MAI Parsons.    Head WDL  Ears WDL  Nose WDL  Mouth WDL  Neck WDL  Breast/Chest Bruising  Shoulder Blades WDL  Spine WDL  (R) Arm/Elbow/Hand Bruising  (L) Arm/Elbow/Hand Edema  Abdomen Bruising  Groin Redness and Blanching  Scrotum/Coccyx/Buttocks Redness, Blanching, and Discoloration  (R) Leg WDL  (L) Leg WDL  (R) Heel/Foot/Toe Scar  (L) Heel/Foot/Toe WDL          Devices In Places Tele Box, Blood Pressure Cuff, Pulse Ox, and SCD's      Interventions In Place Pillows, Q2 Turns, and Heels Loaded W/Pillows    Possible Skin Injury No    Pictures Uploaded Into Epic N/A  Wound Consult Placed N/A  RN Wound Prevention Protocol Ordered No

## 2024-03-21 ENCOUNTER — APPOINTMENT (OUTPATIENT)
Dept: RADIOLOGY | Facility: MEDICAL CENTER | Age: 84
End: 2024-03-21
Attending: HOSPITALIST
Payer: MEDICARE

## 2024-03-21 VITALS
DIASTOLIC BLOOD PRESSURE: 67 MMHG | HEART RATE: 71 BPM | RESPIRATION RATE: 16 BRPM | BODY MASS INDEX: 30.72 KG/M2 | OXYGEN SATURATION: 92 % | SYSTOLIC BLOOD PRESSURE: 158 MMHG | HEIGHT: 66 IN | WEIGHT: 191.14 LBS | TEMPERATURE: 98.4 F

## 2024-03-21 PROBLEM — R47.01 EXPRESSIVE APHASIA: Status: RESOLVED | Noted: 2024-03-20 | Resolved: 2024-03-21

## 2024-03-21 PROBLEM — I16.0 HYPERTENSIVE URGENCY: Status: RESOLVED | Noted: 2024-03-20 | Resolved: 2024-03-21

## 2024-03-21 LAB
APPEARANCE UR: CLEAR
BACTERIA #/AREA URNS HPF: NEGATIVE /HPF
BILIRUB UR QL STRIP.AUTO: NEGATIVE
CHOLEST SERPL-MCNC: 152 MG/DL (ref 100–199)
COLOR UR: YELLOW
EPI CELLS #/AREA URNS HPF: NEGATIVE /HPF
GLUCOSE UR STRIP.AUTO-MCNC: NEGATIVE MG/DL
HDLC SERPL-MCNC: 35 MG/DL
HYALINE CASTS #/AREA URNS LPF: ABNORMAL /LPF
KETONES UR STRIP.AUTO-MCNC: ABNORMAL MG/DL
LDLC SERPL CALC-MCNC: 94 MG/DL
LEUKOCYTE ESTERASE UR QL STRIP.AUTO: ABNORMAL
MICRO URNS: ABNORMAL
NITRITE UR QL STRIP.AUTO: NEGATIVE
PH UR STRIP.AUTO: 6.5 [PH] (ref 5–8)
PROT UR QL STRIP: NEGATIVE MG/DL
RBC # URNS HPF: ABNORMAL /HPF
RBC UR QL AUTO: NEGATIVE
SP GR UR STRIP.AUTO: 1.02
TRIGL SERPL-MCNC: 114 MG/DL (ref 0–149)
UROBILINOGEN UR STRIP.AUTO-MCNC: 0.2 MG/DL
WBC #/AREA URNS HPF: ABNORMAL /HPF

## 2024-03-21 PROCEDURE — A9270 NON-COVERED ITEM OR SERVICE: HCPCS | Performed by: STUDENT IN AN ORGANIZED HEALTH CARE EDUCATION/TRAINING PROGRAM

## 2024-03-21 PROCEDURE — G0378 HOSPITAL OBSERVATION PER HR: HCPCS

## 2024-03-21 PROCEDURE — 700111 HCHG RX REV CODE 636 W/ 250 OVERRIDE (IP): Mod: JG

## 2024-03-21 PROCEDURE — 96376 TX/PRO/DX INJ SAME DRUG ADON: CPT

## 2024-03-21 PROCEDURE — 70450 CT HEAD/BRAIN W/O DYE: CPT

## 2024-03-21 PROCEDURE — 81001 URINALYSIS AUTO W/SCOPE: CPT

## 2024-03-21 PROCEDURE — 80061 LIPID PANEL: CPT

## 2024-03-21 PROCEDURE — 700102 HCHG RX REV CODE 250 W/ 637 OVERRIDE(OP): Performed by: STUDENT IN AN ORGANIZED HEALTH CARE EDUCATION/TRAINING PROGRAM

## 2024-03-21 PROCEDURE — 99239 HOSP IP/OBS DSCHRG MGMT >30: CPT | Performed by: HOSPITALIST

## 2024-03-21 RX ORDER — LABETALOL HYDROCHLORIDE 5 MG/ML
10 INJECTION, SOLUTION INTRAVENOUS EVERY 4 HOURS PRN
Status: DISCONTINUED | OUTPATIENT
Start: 2024-03-21 | End: 2024-03-21 | Stop reason: HOSPADM

## 2024-03-21 RX ORDER — CARVEDILOL 6.25 MG/1
6.25 TABLET ORAL 2 TIMES DAILY WITH MEALS
Qty: 60 TABLET | Refills: 3 | Status: SHIPPED | OUTPATIENT
Start: 2024-03-21

## 2024-03-21 RX ORDER — HYDRALAZINE HYDROCHLORIDE 20 MG/ML
10 INJECTION INTRAMUSCULAR; INTRAVENOUS EVERY 4 HOURS PRN
Status: DISCONTINUED | OUTPATIENT
Start: 2024-03-21 | End: 2024-03-21 | Stop reason: HOSPADM

## 2024-03-21 RX ADMIN — CARVEDILOL 3.12 MG: 6.25 TABLET, FILM COATED ORAL at 08:38

## 2024-03-21 RX ADMIN — OMEPRAZOLE 20 MG: 20 CAPSULE, DELAYED RELEASE ORAL at 04:35

## 2024-03-21 RX ADMIN — LABETALOL HYDROCHLORIDE 10 MG: 5 INJECTION INTRAVENOUS at 04:37

## 2024-03-21 RX ADMIN — ASPIRIN 81 MG: 81 TABLET, CHEWABLE ORAL at 04:35

## 2024-03-21 RX ADMIN — LEVOTHYROXINE SODIUM 100 MCG: 0.1 TABLET ORAL at 04:35

## 2024-03-21 RX ADMIN — PREGABALIN 75 MG: 75 CAPSULE ORAL at 04:35

## 2024-03-21 ASSESSMENT — PAIN DESCRIPTION - PAIN TYPE: TYPE: ACUTE PAIN

## 2024-03-21 NOTE — PROGRESS NOTES
Patient discharged back to [facility], transported by daughter and son-in-law. IV removed. Monitor removed. Monitor room notified. Discharge instructions gone over with patient and family members, all verbalized understanding. Transported downstairs via WC with Unit Clerk.

## 2024-03-21 NOTE — DISCHARGE PLANNING
Care Transition Team Assessment      Called and spoke with KARON Perez and verified where patient lives. Patient lives at [facility] in [facility] [place]. [address]. Spoke with patients son Cade at bedside. Normally uses Wheelchair @ baseline. [facility] takes care of all meds. Anticipate return to [facility] and family will take patient home @ D/C.      Information Source  Orientation Level: Oriented to place, Oriented to time, Oriented to person  Information Given By: Relative (Son)  Informant's Name: Cade  Who is responsible for making decisions for patient? : Adult child  Name(s) of Primary Decision Maker: Ana BRANTLEY     Interdisciplinary Discharge Planning  Lives with - Patient's Self Care Capacity: Other (Comments) ([facility])  Patient or legal guardian wants to designate a caregiver: Yes  Caregiver name: Pamela Sanders  ([facility]) Authorization for Release of Health Information has been completed: Yes (POA)  Support Systems: Children  Housing / Facility: Assisted Living Residence  Name of Care Facility: [facility]  Do You Take your Prescribed Medications Regularly: Yes  Able to Return to Previous ADL's:  (W/C at baseline.)  Mobility Issues: Yes  Prior Services: Intermittent Physical Support for ADL Per Service ([facility])  Patient Prefers to be Discharged to:: [facility]  Home  Durable Medical Equipment: Other - Specify (Wheel chair)    Discharge Preparedness  What are your discharge supports?: Child  Prior Functional Level: Uses Wheelchair    Functional Assesment  Prior Functional Level: Uses Wheelchair    Finances  Prescription Coverage: Yes    Advance Directive  Advance Directive?:  (Advanced Health care directive)    Discharge Risks or Barriers  Patient risk factors: Vulnerable adult    Anticipated Discharge Information  Discharge Disposition: Discharged to home/self care (01)  Discharge Address: [address] [facility] [place]  Discharge Contact Phone Number: [phone]

## 2024-03-21 NOTE — DISCHARGE SUMMARY
Discharge Summary    CHIEF COMPLAINT ON ADMISSION  Chief Complaint   Patient presents with    Other     AMS       Reason for Admission  Stroke     Admission Date  3/19/2024    CODE STATUS  Prior    HPI & HOSPITAL COURSE  Inga Galvan is a 84 y.o. female who was transferred from outside facility 3/19/2024 with expressive aphasia.  History of per chart review as patient has advanced dementia.  Patient sent in to outside facility ED due to expressive aphasia, reportedly per family she is AO x 2-3 and is conversant.  She also was not following commands appropriately.  No history of stroke in the past.  Reportedly her exacerbation is already resolving prior to transfer, on arrival to the this facility family reported her symptoms had resolved.  CT head at outside facility reviewed and shows no acute abnormality.  In the ED afebrile, BP significantly elevated in the 200s systolic.     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  Patient did have a urinary tract infection and has had a history of having these episodes when she has an infection.  Patient's was already on Levaquin and Levaquin was continued on her hospital stay.  We monitored and titrate her medication for better blood pressure control.  He had an EEG did not show any evidence of any acute abnormality.  She had a repeat CT of the head did not show any acute abnormality.    Patient was cleared for discharge home with the below medication    I spoke to the daughter-in-law over the phone and they will be some ongoing talks about palliative care    Therefore, she is discharged in good and stable condition to home with close outpatient follow-up.    The patient recovered much more quickly than anticipated on admission.    Discharge  Date  3/21/2024    FOLLOW UP ITEMS POST DISCHARGE      DISCHARGE DIAGNOSES  Principal Problem (Resolved):    Expressive aphasia (POA: Yes)  Active Problems:    Primary hypertension (POA: Yes)    Acquired hypothyroidism (POA: Yes)    Dyslipidemia (POA: Yes)  Resolved Problems:    Hypertensive urgency (POA: Yes)      FOLLOW UP  No future appointments.  No follow-up provider specified.    MEDICATIONS ON DISCHARGE     Medication List        CHANGE how you take these medications        Instructions   carvedilol 6.25 MG Tabs  What changed:   medication strength  how much to take  additional instructions  Commonly known as: Coreg   Take 1 Tablet by mouth 2 times a day with meals. Ok for [facility] to dispense medication  Dose: 6.25 mg            CONTINUE taking these medications        Instructions   artificial tears Oint ophth ointment  Commonly known as: Eye Lubricant   Apply 1 Application to both eyes as needed.  Dose: 1 Application     aspirin 81 MG Chew chewable tablet  Commonly known as: Asa   Chew 81 mg every day.  Dose: 81 mg     atorvastatin 20 MG Tabs  Commonly known as: Lipitor   Take 20 mg by mouth every day.  Dose: 20 mg     docusate sodium 50 MG Caps  Commonly known as: Colace   Take 50 mg by mouth 2 times a day as needed for Constipation.  Dose: 50 mg     insulin glargine 100 UNIT/ML Sopn injection  Commonly known as: Lantus   Inject 20 Units under the skin every evening.  Dose: 20 Units     isosorbide mononitrate SR 30 MG Tb24  Commonly known as: Imdur   Take 30 mg by mouth every morning.  Dose: 30 mg     LACTOBACILLUS RHAMNOSUS (GG) PO   Take  by mouth.     levoFLOXacin 500 MG tablet  Commonly known as: Levaquin   Take 250 mg by mouth every day.  Dose: 250 mg     levothyroxine 100 MCG Tabs  Commonly known as: Synthroid   Take 100 mcg by mouth every morning on an empty stomach.  Dose: 100 mcg     nitroglycerin 0.4 MG Subl  Commonly known as: Nitrostat   Place 0.4 mg under the tongue every 5 minutes  as needed for Chest Pain.  Dose: 0.4 mg     nystatin powder  Commonly known as: Mycostatin   Apply 1 Application topically 3 times a day.  Dose: 1 Application     oxybutynin 5 MG Tabs  Commonly known as: Ditropan   Take 10 mg by mouth every day.  Dose: 10 mg     pantoprazole 40 MG Tbec  Commonly known as: Protonix   Take 40 mg by mouth every day.  Dose: 40 mg     pregabalin 75 MG Caps  Commonly known as: Lyrica   Take 75 mg by mouth 3 times a day.  Dose: 75 mg     traMADol 50 MG Tabs  Commonly known as: Ultram   Take 50 mg by mouth every 8 hours as needed for Moderate Pain.  Dose: 50 mg              Allergies  Allergies   Allergen Reactions    Cephalexin     Dilaudid [Hydromorphone]     Diphtheria Toxoid-Containing Vaccines     Latex     Macrodantin [Nitrofurantoin]     Penicillins     Tetanus Toxoids        DIET  No orders of the defined types were placed in this encounter.      ACTIVITY  As tolerated.  Weight bearing as tolerated    CONSULTATIONS      PROCEDURES  Eeg  ct head    LABORATORY  Lab Results   Component Value Date    SODIUM 141 03/19/2024    POTASSIUM 4.0 03/19/2024    CHLORIDE 106 03/19/2024    CO2 23 03/19/2024    GLUCOSE 98 03/19/2024    BUN 24 (H) 03/19/2024    CREATININE 0.92 03/19/2024        Lab Results   Component Value Date    WBC 10.9 (H) 03/19/2024    HEMOGLOBIN 12.9 03/19/2024    HEMATOCRIT 38.6 03/19/2024    PLATELETCT 188 03/19/2024        Total time of the discharge process exceeds 39 minutes.

## 2024-03-21 NOTE — PROGRESS NOTES
Report received from night shift RN. Patient drowsy, attempted to complete assessment and neuro check but patient was too sleepy. Patient able to tell me her name and  but unable to answer other questions because of drowsiness. Vital signs stable. Even and unlabored respirations on RA, patient did not complain of pain when asked. Call light within reach, bed locked and in lowest position, bed alarm on.

## 2024-03-21 NOTE — CARE PLAN
The patient is Stable - Low risk of patient condition declining or worsening    Shift Goals  Clinical Goals: CT Head, neuro checks, BP mgmt  Patient Goals: Go home  Family Goals: N/A    Progress made toward(s) clinical / shift goals:    Problem: Knowledge Deficit - Stroke Education  Goal: Patient's knowledge of stroke and risk factors will improve  Outcome: Progressing     Problem: Neuro Status  Goal: Neuro status will remain stable or improve  Outcome: Progressing       Patient is not progressing towards the following goals:

## 2024-03-21 NOTE — PROGRESS NOTES
Monitor summary: SR and in and out of pacing 72-74, ND 0.20, QRS 0.09, QT 0.40, with frequent PACs per strip from monitor room.

## 2024-03-21 NOTE — PROGRESS NOTES
Pt daughter in law and son spent time today with the pt. Pt daughter-in-law (flavio) is the POA for the pt in [place] and requests the pt be bloodless. POA for [place] did not transfer. Documents for [place] POA in chart. Pt remained disoriented through the day and so bloodless paperwork have not been signed.

## 2024-03-21 NOTE — CARE PLAN
The patient is Stable - Low risk of patient condition declining or worsening    Shift Goals  Clinical Goals: Neuro checks, NIH, transfer to neuro  Patient Goals: rest  Family Goals: EVER    Progress made toward(s) clinical / shift goals:    Problem: Optimal Care of the Stroke Patient  Goal: Optimal emergency care for the stroke patient  Description: Target End Date:  End of day 1    Time of Onset    1.  Time of last known well obtained  2.  Patient and family/caregiver verbalize understanding of diagnosis, medications and testing  3.  NIHSS performed and documented, including date and time, for ischemic stroke patients prior to any acute recanalization therapy (thrombolytics or mechanical) or within 12 hours of arrival if no intervention is warranted  4.  Consults and referrals placed to appropriate departments    Medications Administration as Ordered:    1.  Implement appropriate reversal agents for INR greater than 1.5  2.  Pre-alteplase administration of antihypertensives for SBP >185 DBP >110  3.  Post-alteplase administration of antihypertensives for SBP >185, DBP >105  4.  Thrombolytic Therapy for qualifying ischemic stroke patients who arrive within 4.5 hours of time of Last Known Well. Thrombolytic therapy administered within 30 minutes or a documented reason for delay  Outcome: Progressing  Goal: Optimal acute care for the stroke patient  Description: Target End Date:  1 to 3 days    - Vital signs and neuro checks performed and documented per order  - NIHSS completed and documented per order  - Continuous telemetry monitoring for 72 hours or until discontinued by provider  - Head CT without contrast obtained  - Consideration of MRI/MRA  - MRI screening form completed in worklist if MRI ordered  - Echocardiogram with Bubble Study ordered/completed with consideration of KAYLA  - Carotid Doppler ordered/completed (Not required if CTA of neck completed in ED)  - Lipid Panel obtained within 48 hours of admission  -  PT, PTT, INR obtained per Anticoagulation orders (if applicable)  - Antithrombotic therapy by end of hospital day 2 for ischemic stroke. Provider must document reason if contraindicated.  - Venous Thromboembolism (VTE) Prophylaxis by end of hospital day 2 for ischemic and hemorrhagic stroke. Provider must document reason if contraindicated  - Dysphagia screen completed and documented prior to any PO intake. Patient to remain NPO until Speech Therapy evaluation if thrombolytic or thrombectomy performed  - Rehabilitation assessment including PT/OT/SLP evaluations for referral to Physical Medicine and Rehabilitation services. If none needed, provider needs to document reason  - Neurology consult placed  - Consideration of cardiology consult for cryptogenic strokes  Outcome: Progressing     Problem: Knowledge Deficit - Stroke Education  Goal: Patient's knowledge of stroke and risk factors will improve  Description: Target End Date:  1-3 days or as soon as patient condition allows    Document in Patient Education    1.  Stroke education booklet provided  2.  Education regarding EMS activation, need for follow up, medication prescribed at discharge, risk factors for stroke/lifestyle modifications, warning signs and symptoms of stroke provided  Outcome: Progressing     Problem: Discharge Planning - Stroke  Goal: Ensure Stroke Core Measures are met prior to discharge  Description: Target End Date:  Prior to discharge or change in level of care    1. Patient discharged on antithrombotic therapy (Ischemic Stroke)  2. Patient discharged on intensive statin if LDL is greater than or equal to 70 mg/dl (Ischemic Stroke)  3. Patient discharged on anticoagulation therapy for patients with atrial fibrillation/flutter (Ischemic Stroke)  4. Smoking education/cessation provided if applicable  Outcome: Progressing  Goal: Patient’s continuum of care needs will be met  Description: Target End Date:  Prior to discharge or change in level  of care    1.  Potential discharge barriers identified upon admission and throughout hospital stay  2.  Ensure appropriate referrals in place for follow up with specialists after discharge  3.  Ensure appropriate referrals in place for DMEs if applicable  4.  Collaboration with transitional care team and interdisciplinary team to meet discharge needs  5.  Involve patient and family/caregiver in prioritizing goals for discharge planning  6.  Educate patient and caregiver about discharge instructions, medications, and follow up appointments  7.  Referral placed to [facility]  8.  Assure orders and follow up appointments are made for outpatient extended cardiac monitoring if appropriate  Outcome: Progressing     Problem: Neuro Status  Goal: Neuro status will remain stable or improve  Description: Target End Date:  Prior to discharge or change in level of care    Document on Neuro assessment in the Assessment flowsheet    1.  Assess and monitor neurologic status per provider order/protocol/unit policy  2.  Assess level of consciousness and orientation  3.  Assess for speech, dysarthria, dysphagia, facial symmetry  4.  Assess visual field, eye movements, gaze preference, pupil reaction and size  5.  Assess muscle strength and motor response in all four extremities  6.  Assess for sensation (numbness and tingling)  7.  Assess basic neuro reflexes (cough, gag, corneal)  8.  Identify changes in neuro status and report to provider for testing/treatment orders  Outcome: Progressing     Problem: Hemodynamic Monitoring  Goal: Patient's hemodynamics, fluid balance and neurologic status will be stable or improve  Description: Target End Date:  Prior to discharge or change in level of care    1.  Vital signs, pulse oximetry and cardiac monitor per provider order and/or policy  2.  Frequent pulse checks performed post thrombectomy  3.  Frequent monitoring for signs of bleeding post TPA administration  4.  Proper  management of IV infusions  5.  Intake and output monitored per provider order  6.  Daily weight obtained per unit policy or provider order  7.  Peripheral pulses and capillary refill assessed as needed  8.  Monitor for signs/symptoms of excessive bleeding  9.  Body temperature assessed and fevers treated  10. Patient positioned for maximum circulation/cardiac output  Outcome: Progressing     Problem: Respiratory - Stroke Patient  Goal: Patient will achieve/maintain optimum respiratory rate/effort  Description: Target End Date:  Prior to discharge or change in level of care    Document on Assessment flowsheet    1.  Assess and monitor respiratory rate, rhythm, depth and effort of respiration  2.  Oxygenation assessed throughout shift (recommendation of >94% for new stroke patients)  3.  Oxygen administered and/or titrated per order  4.  Collaboration with RT to administer medication/treatments per order  5.  Patient educated on importance of turning, coughing, and deep breathing  6.  Patient positioned for maximum ventilatory efficiency  7.  Airway suctioning provided as needed  8.  Incentive spirometry encouraged 5-10 times every hour or while awake  Outcome: Progressing     Problem: Dysphagia  Goal: Dysphagia will improve  Description: Target End Date:  Prior to discharge or change in level of care    1.  Assess and monitor ability to swallow  2.  Collaborate with Speech Therapy to determine appropriate adaptation for safe administration of medications and oral nutrition  3.  Elevate head of bed to 90 degrees during feedings and for 30 minutes after each feeding  4.  Encourage proper swallowing techniques  5.  Screening on admission or as soon as possible  Outcome: Progressing     Problem: Risk for Aspiration  Goal: Patient's risk for aspiration will be absent or decrease  Description: Target End Date:  Prior to discharge or change in level of care    1.   Complete dysphagia screening on admission  2.   NPO until  dysphagia screening complete or medically cleared  3.   Collaborate with Speech Therapy, Clinical Dietitian and interdisciplinary team  4.   Implement aspiration precautions  5.   Assist patient up to chair for meals  6.   Elevate head of bed 90 degrees if patient is unable to get out of bed  7.   Encourage small bites  8.   Ensure foods/liquids are of appropriate consistency  9.   Assess for any signs/symptoms of aspiration  10. Assess breath sounds and vital signs after oral intake  Outcome: Progressing     Problem: Urinary Elimination  Goal: Establish and maintain regular urinary output  Description: Target End Date:  Prior to discharge or change in level of care    Document on I/O and Assessment flowsheets    1.  Evaluate need to continue indwelling catheter every shift  2.  Assess signs and symptoms of urinary retention  3.  Assess post-void residual volumes  4.  Implement bladder training program  5.  Encourage scheduled voidings  6.  Assist patient to sit on bedside commode or toilet for voiding  7.  Educate patient and family/caregiver on use and purpose of urine collection devices (document in Patient Education)  Outcome: Progressing     Problem: Bowel Elimination  Goal: Establish and maintain regular bowel function  Description: Target End Date:  Prior to discharge or change in level of care    1.   Note date of last BM  2.   Educate about diet, fluid intake, medication and activity to promote bowel function  3.   Educate signs and symptoms of constipation and interventions to implement  4.   Pharmacologic bowel management per provider order  5.   Regular toileting schedule  6.   Upright position for toileting  7.   High fiber diet  8.   Encourage hydration  9.   Collaborate with Clinical Dietician  10. Care and maintenance of ostomy if applicable  Outcome: Progressing     Problem: Mobility - Stroke  Goal: Patient's capacity to carry out activities will improve  Description: Target End Date:  Prior to  discharge or change in level of care    1.  Assess for barriers to mobility/activity  2.  Implement activity per interdisciplinary team recommendations  3.  Target activity level identified and patient/family/caregiver aware of goal  4.  Provide assistive devices  5.  Instruct patient/caregiver on proper use of assistive/adaptive devices  6.  Schedule activities and rest periods to decrease effects of fatigue  7.  Encourage mobilization to extent of ability  8.  Maintain proper body alignment  9.  Provide adequate pain management to allow progressive mobilization  10. Implement pace maker precautions as needed  Outcome: Progressing     Problem: Knowledge Deficit - Standard  Goal: Patient and family/care givers will demonstrate understanding of plan of care, disease process/condition, diagnostic tests and medications  Description: Target End Date:  1-3 days or as soon as patient condition allows    Document in Patient Education    1.  Patient and family/caregiver oriented to unit, equipment, visitation policy and means for communicating concern  2.  Complete/review Learning Assessment  3.  Assess knowledge level of disease process/condition, treatment plan, diagnostic tests and medications  4.  Explain disease process/condition, treatment plan, diagnostic tests and medications  Outcome: Progressing     Problem: Skin Integrity  Goal: Skin integrity is maintained or improved  Description: Target End Date:  Prior to discharge or change in level of care    Document interventions on Skin Risk/Denis flowsheet groups and corresponding LDA    1.  Assess and monitor skin integrity, appearance and/or temperature  2.  Assess risk factors for impaired skin integrity and/or pressures ulcers  3.  Implement precautions to protect skin integrity in collaboration with interdisciplinary team  4.  Implement pressure ulcer prevention protocol if at risk for skin breakdown  5.  Confirm wound care consult if at risk for skin  breakdown  6.  Ensure patient use of pressure relieving devices  (Low air loss bed, waffle overlay, heel protectors, ROHO cushion, etc)  Outcome: Progressing     Problem: Fall Risk  Goal: Patient will remain free from falls  Description: Target End Date:  Prior to discharge or change in level of care    Document interventions on the [facility] Fall Risk Assessment    1.  Assess for fall risk factors  2.  Implement fall precautions  Outcome: Progressing       Patient is not progressing towards the following goals:      Problem: Psychosocial - Patient Condition  Goal: Patient's ability to verbalize feelings about condition will improve  Description: Target End Date:  Prior to discharge or change in level of care    1.  Discuss coping with medical condition and its effects  2.  Encourage patient participation in care  3.  Encourage acknowledgement of body changes and accompanying emotions  4.  Perform depression screening  Outcome: Not Progressing  Goal: Patient's ability to re-evaluate and adapt role responsibilities will improve  Description: Target End Date:  Prior to discharge or change in level of care    1.  Assess family support  2.  Encourage support system participation in care  3.  Encouraged verbalization of feelings regarding caregiver responsibilities  4.  Discuss changes in role and responsibilities caused by patient's condition  Outcome: Not Progressing

## 2024-03-21 NOTE — PROGRESS NOTES
Patient A x O 1. Patient c/o bed linens being soiled, bed linens changed and barrier powder placed under breasts due to moisture. Purewick replaced. BP increased, pulled medication for high blood pressure, after retaking blood pressure, dose no longer needed and med returned to ADS. Patient bed locked and in lowest position, call light within reach, no further needs at this time.

## 2024-03-21 NOTE — PROGRESS NOTES
"Per day shift report yesterday evening, patient intermittently cooperative with neuro checks/NIH. For this RN and primary RN Niki, patient cooperative and able to follow commands for NIH assessment and Q4 neuro checks for 2000 assessment. However throughout the night, patient uncooperative with midnight and 0400 neuro checks. Patient stated \"how much more of this do I need to do\" and \"stop bugging me.\" Education given on indication for neuro checks and NIH.  Patient able to answer her name and follow commands for turning/repositioning and taking medications but unwilling to participate in neuro checks at midnight and 0400.       Also - discussed with Neri WATSON that patient's BP this /95, recheck 185/79. SHIRLEY ordered PRN blood pressure medication. Administered per orders. Recheck after administration 154/72.   "

## 2024-03-21 NOTE — CARE PLAN
The patient is Watcher - Medium risk of patient condition declining or worsening    Shift Goals  Clinical Goals: MRI  Patient Goals: Rest  Family Goals: Pt to be comfortable    Progress made toward(s) clinical / shift goals:    Problem: Optimal Care of the Stroke Patient  Goal: Optimal emergency care for the stroke patient  Outcome: Progressing  Goal: Optimal acute care for the stroke patient  Outcome: Progressing     Problem: Knowledge Deficit - Stroke Education  Goal: Patient's knowledge of stroke and risk factors will improve  Outcome: Progressing     Problem: Psychosocial - Patient Condition  Goal: Patient's ability to verbalize feelings about condition will improve  Outcome: Progressing  Goal: Patient's ability to re-evaluate and adapt role responsibilities will improve  Outcome: Progressing     Problem: Discharge Planning - Stroke  Goal: Ensure Stroke Core Measures are met prior to discharge  Outcome: Progressing  Goal: Patient’s continuum of care needs will be met  Outcome: Progressing     Problem: Neuro Status  Goal: Neuro status will remain stable or improve  Outcome: Progressing     Problem: Hemodynamic Monitoring  Goal: Patient's hemodynamics, fluid balance and neurologic status will be stable or improve  Outcome: Progressing     Problem: Respiratory - Stroke Patient  Goal: Patient will achieve/maintain optimum respiratory rate/effort  Outcome: Progressing     Problem: Dysphagia  Goal: Dysphagia will improve  Outcome: Progressing     Problem: Risk for Aspiration  Goal: Patient's risk for aspiration will be absent or decrease  Outcome: Progressing     Problem: Urinary Elimination  Goal: Establish and maintain regular urinary output  Outcome: Progressing     Problem: Bowel Elimination  Goal: Establish and maintain regular bowel function  Outcome: Progressing     Problem: Mobility - Stroke  Goal: Spasticity will be prevented or improved  Outcome: Progressing  Goal: Subluxation will be prevented or  improved  Outcome: Progressing     Problem: Self Care  Goal: Patient will have the ability to perform ADLs independently or with assistance (bathe, groom, dress, toilet and feed)  Outcome: Progressing     Problem: Skin Integrity  Goal: Skin integrity is maintained or improved  Outcome: Progressing       Patient is not progressing towards the following goals:      Problem: Knowledge Deficit - Standard  Goal: Patient and family/care givers will demonstrate understanding of plan of care, disease process/condition, diagnostic tests and medications  Outcome: Not Progressing

## 2024-03-28 ENCOUNTER — TELEPHONE (OUTPATIENT)
Dept: HEALTH INFORMATION MANAGEMENT | Facility: OTHER | Age: 84
End: 2024-03-28
Payer: MEDICARE

## 2024-04-18 ENCOUNTER — TELEPHONE (OUTPATIENT)
Dept: SCHEDULING | Facility: IMAGING CENTER | Age: 84
End: 2024-04-18
Payer: MEDICARE

## 2024-07-16 ENCOUNTER — APPOINTMENT (OUTPATIENT)
Dept: NEUROLOGY | Facility: MEDICAL CENTER | Age: 84
End: 2024-07-16
Attending: PSYCHIATRY & NEUROLOGY
Payer: MEDICARE

## 2025-01-01 ENCOUNTER — APPOINTMENT (OUTPATIENT)
Dept: RADIOLOGY | Facility: MEDICAL CENTER | Age: 85
DRG: 536 | End: 2025-01-01
Attending: EMERGENCY MEDICINE
Payer: MEDICARE

## 2025-01-01 ENCOUNTER — HOSPITAL ENCOUNTER (OUTPATIENT)
Dept: RADIOLOGY | Facility: MEDICAL CENTER | Age: 85
End: 2025-01-16

## 2025-01-01 ENCOUNTER — APPOINTMENT (OUTPATIENT)
Dept: RADIOLOGY | Facility: MEDICAL CENTER | Age: 85
DRG: 536 | End: 2025-01-01
Attending: STUDENT IN AN ORGANIZED HEALTH CARE EDUCATION/TRAINING PROGRAM
Payer: MEDICARE

## 2025-01-01 ENCOUNTER — HOSPITAL ENCOUNTER (INPATIENT)
Facility: MEDICAL CENTER | Age: 85
LOS: 3 days | DRG: 536 | End: 2025-01-19
Attending: EMERGENCY MEDICINE | Admitting: STUDENT IN AN ORGANIZED HEALTH CARE EDUCATION/TRAINING PROGRAM
Payer: MEDICARE

## 2025-01-01 ENCOUNTER — ANESTHESIA EVENT (OUTPATIENT)
Dept: SURGERY | Facility: MEDICAL CENTER | Age: 85
DRG: 536 | End: 2025-01-01
Payer: MEDICARE

## 2025-01-01 ENCOUNTER — ANESTHESIA (OUTPATIENT)
Dept: SURGERY | Facility: MEDICAL CENTER | Age: 85
DRG: 536 | End: 2025-01-01
Payer: MEDICARE

## 2025-01-01 VITALS
HEIGHT: 66 IN | OXYGEN SATURATION: 92 % | WEIGHT: 191 LBS | RESPIRATION RATE: 12 BRPM | BODY MASS INDEX: 30.7 KG/M2 | SYSTOLIC BLOOD PRESSURE: 92 MMHG | TEMPERATURE: 97.3 F | DIASTOLIC BLOOD PRESSURE: 30 MMHG | HEART RATE: 45 BPM

## 2025-01-01 DIAGNOSIS — S72.001A CLOSED FRACTURE OF RIGHT HIP, INITIAL ENCOUNTER (HCC): ICD-10-CM

## 2025-01-01 DIAGNOSIS — Z51.5 COMFORT MEASURES ONLY STATUS: ICD-10-CM

## 2025-01-01 LAB
APPEARANCE UR: ABNORMAL
APTT PPP: 31.4 SEC (ref 24.7–36)
BACTERIA #/AREA URNS HPF: ABNORMAL /HPF
BASOPHILS # BLD AUTO: 0.4 % (ref 0–1.8)
BASOPHILS # BLD: 0.05 K/UL (ref 0–0.12)
BILIRUB UR QL STRIP.AUTO: NEGATIVE
CASTS URNS QL MICRO: ABNORMAL /LPF (ref 0–2)
COLOR UR: YELLOW
EKG IMPRESSION: NORMAL
EOSINOPHIL # BLD AUTO: 0.05 K/UL (ref 0–0.51)
EOSINOPHIL NFR BLD: 0.4 % (ref 0–6.9)
EPITHELIAL CELLS 1715: ABNORMAL /HPF (ref 0–5)
ERYTHROCYTE [DISTWIDTH] IN BLOOD BY AUTOMATED COUNT: 51.5 FL (ref 35.9–50)
GLUCOSE BLD STRIP.AUTO-MCNC: 81 MG/DL (ref 65–99)
GLUCOSE UR STRIP.AUTO-MCNC: NEGATIVE MG/DL
HCT VFR BLD AUTO: 35.7 % (ref 37–47)
HGB BLD-MCNC: 11.1 G/DL (ref 12–16)
IMM GRANULOCYTES # BLD AUTO: 0.07 K/UL (ref 0–0.11)
IMM GRANULOCYTES NFR BLD AUTO: 0.5 % (ref 0–0.9)
INR PPP: 1.03 (ref 0.87–1.13)
KETONES UR STRIP.AUTO-MCNC: NEGATIVE MG/DL
LEUKOCYTE ESTERASE UR QL STRIP.AUTO: ABNORMAL
LYMPHOCYTES # BLD AUTO: 3 K/UL (ref 1–4.8)
LYMPHOCYTES NFR BLD: 21.3 % (ref 22–41)
MCH RBC QN AUTO: 32.2 PG (ref 27–33)
MCHC RBC AUTO-ENTMCNC: 31.1 G/DL (ref 32.2–35.5)
MCV RBC AUTO: 103.5 FL (ref 81.4–97.8)
MICRO URNS: ABNORMAL
MONOCYTES # BLD AUTO: 0.87 K/UL (ref 0–0.85)
MONOCYTES NFR BLD AUTO: 6.2 % (ref 0–13.4)
NEUTROPHILS # BLD AUTO: 10.06 K/UL (ref 1.82–7.42)
NEUTROPHILS NFR BLD: 71.2 % (ref 44–72)
NITRITE UR QL STRIP.AUTO: POSITIVE
NRBC # BLD AUTO: 0 K/UL
NRBC BLD-RTO: 0 /100 WBC (ref 0–0.2)
PH UR STRIP.AUTO: 5.5 [PH] (ref 5–8)
PLATELET # BLD AUTO: 101 K/UL (ref 164–446)
PMV BLD AUTO: 12.2 FL (ref 9–12.9)
PROT UR QL STRIP: 30 MG/DL
PROTHROMBIN TIME: 13.5 SEC (ref 12–14.6)
RBC # BLD AUTO: 3.45 M/UL (ref 4.2–5.4)
RBC # URNS HPF: ABNORMAL /HPF (ref 0–2)
RBC UR QL AUTO: ABNORMAL
SP GR UR STRIP.AUTO: 1.01
UROBILINOGEN UR STRIP.AUTO-MCNC: 0.2 EU/DL
WBC # BLD AUTO: 14.1 K/UL (ref 4.8–10.8)
WBC #/AREA URNS HPF: ABNORMAL /HPF

## 2025-01-01 PROCEDURE — 73501 X-RAY EXAM HIP UNI 1 VIEW: CPT | Mod: RT

## 2025-01-01 PROCEDURE — 160048 HCHG OR STATISTICAL LEVEL 1-5: Performed by: STUDENT IN AN ORGANIZED HEALTH CARE EDUCATION/TRAINING PROGRAM

## 2025-01-01 PROCEDURE — 770001 HCHG ROOM/CARE - MED/SURG/GYN PRIV*

## 2025-01-01 PROCEDURE — 700111 HCHG RX REV CODE 636 W/ 250 OVERRIDE (IP): Mod: JZ | Performed by: STUDENT IN AN ORGANIZED HEALTH CARE EDUCATION/TRAINING PROGRAM

## 2025-01-01 PROCEDURE — 303105 HCHG CATHETER EXTRA

## 2025-01-01 PROCEDURE — 700102 HCHG RX REV CODE 250 W/ 637 OVERRIDE(OP): Performed by: STUDENT IN AN ORGANIZED HEALTH CARE EDUCATION/TRAINING PROGRAM

## 2025-01-01 PROCEDURE — 160029 HCHG SURGERY MINUTES - 1ST 30 MINS LEVEL 4: Performed by: STUDENT IN AN ORGANIZED HEALTH CARE EDUCATION/TRAINING PROGRAM

## 2025-01-01 PROCEDURE — A9270 NON-COVERED ITEM OR SERVICE: HCPCS | Performed by: STUDENT IN AN ORGANIZED HEALTH CARE EDUCATION/TRAINING PROGRAM

## 2025-01-01 PROCEDURE — 96376 TX/PRO/DX INJ SAME DRUG ADON: CPT

## 2025-01-01 PROCEDURE — 700111 HCHG RX REV CODE 636 W/ 250 OVERRIDE (IP): Mod: TB | Performed by: ANESTHESIOLOGY

## 2025-01-01 PROCEDURE — 99291 CRITICAL CARE FIRST HOUR: CPT

## 2025-01-01 PROCEDURE — 93005 ELECTROCARDIOGRAM TRACING: CPT | Mod: TC | Performed by: ANESTHESIOLOGY

## 2025-01-01 PROCEDURE — 93005 ELECTROCARDIOGRAM TRACING: CPT | Mod: TC | Performed by: EMERGENCY MEDICINE

## 2025-01-01 PROCEDURE — 99233 SBSQ HOSP IP/OBS HIGH 50: CPT | Performed by: STUDENT IN AN ORGANIZED HEALTH CARE EDUCATION/TRAINING PROGRAM

## 2025-01-01 PROCEDURE — 99497 ADVNCD CARE PLAN 30 MIN: CPT | Mod: 25 | Performed by: STUDENT IN AN ORGANIZED HEALTH CARE EDUCATION/TRAINING PROGRAM

## 2025-01-01 PROCEDURE — 36415 COLL VENOUS BLD VENIPUNCTURE: CPT

## 2025-01-01 PROCEDURE — 160035 HCHG PACU - 1ST 60 MINS PHASE I: Performed by: STUDENT IN AN ORGANIZED HEALTH CARE EDUCATION/TRAINING PROGRAM

## 2025-01-01 PROCEDURE — 96375 TX/PRO/DX INJ NEW DRUG ADDON: CPT

## 2025-01-01 PROCEDURE — 82962 GLUCOSE BLOOD TEST: CPT

## 2025-01-01 PROCEDURE — 85610 PROTHROMBIN TIME: CPT

## 2025-01-01 PROCEDURE — 51702 INSERT TEMP BLADDER CATH: CPT

## 2025-01-01 PROCEDURE — 85025 COMPLETE CBC W/AUTO DIFF WBC: CPT

## 2025-01-01 PROCEDURE — 99223 1ST HOSP IP/OBS HIGH 75: CPT | Mod: 25 | Performed by: STUDENT IN AN ORGANIZED HEALTH CARE EDUCATION/TRAINING PROGRAM

## 2025-01-01 PROCEDURE — 99223 1ST HOSP IP/OBS HIGH 75: CPT | Mod: 57 | Performed by: STUDENT IN AN ORGANIZED HEALTH CARE EDUCATION/TRAINING PROGRAM

## 2025-01-01 PROCEDURE — 96374 THER/PROPH/DIAG INJ IV PUSH: CPT

## 2025-01-01 PROCEDURE — 700111 HCHG RX REV CODE 636 W/ 250 OVERRIDE (IP): Mod: JZ | Performed by: EMERGENCY MEDICINE

## 2025-01-01 PROCEDURE — 99223 1ST HOSP IP/OBS HIGH 75: CPT | Performed by: INTERNAL MEDICINE

## 2025-01-01 PROCEDURE — 85730 THROMBOPLASTIN TIME PARTIAL: CPT

## 2025-01-01 PROCEDURE — 99239 HOSP IP/OBS DSCHRG MGMT >30: CPT | Performed by: STUDENT IN AN ORGANIZED HEALTH CARE EDUCATION/TRAINING PROGRAM

## 2025-01-01 PROCEDURE — 700111 HCHG RX REV CODE 636 W/ 250 OVERRIDE (IP): Mod: JZ

## 2025-01-01 PROCEDURE — 160009 HCHG ANES TIME/MIN: Performed by: STUDENT IN AN ORGANIZED HEALTH CARE EDUCATION/TRAINING PROGRAM

## 2025-01-01 PROCEDURE — 81001 URINALYSIS AUTO W/SCOPE: CPT

## 2025-01-01 PROCEDURE — 160002 HCHG RECOVERY MINUTES (STAT): Performed by: STUDENT IN AN ORGANIZED HEALTH CARE EDUCATION/TRAINING PROGRAM

## 2025-01-01 RX ORDER — SODIUM CHLORIDE, SODIUM LACTATE, POTASSIUM CHLORIDE, CALCIUM CHLORIDE 600; 310; 30; 20 MG/100ML; MG/100ML; MG/100ML; MG/100ML
INJECTION, SOLUTION INTRAVENOUS CONTINUOUS
Status: DISCONTINUED | OUTPATIENT
Start: 2025-01-01 | End: 2025-01-01 | Stop reason: HOSPADM

## 2025-01-01 RX ORDER — ONDANSETRON 4 MG/1
8 TABLET, ORALLY DISINTEGRATING ORAL EVERY 8 HOURS PRN
Status: DISCONTINUED | OUTPATIENT
Start: 2025-01-01 | End: 2025-01-01 | Stop reason: HOSPADM

## 2025-01-01 RX ORDER — ALBUTEROL SULFATE 5 MG/ML
2.5 SOLUTION RESPIRATORY (INHALATION)
Status: DISCONTINUED | OUTPATIENT
Start: 2025-01-01 | End: 2025-01-01 | Stop reason: HOSPADM

## 2025-01-01 RX ORDER — OXYCODONE HYDROCHLORIDE 5 MG/1
5 TABLET ORAL
Status: DISCONTINUED | OUTPATIENT
Start: 2025-01-01 | End: 2025-01-01 | Stop reason: HOSPADM

## 2025-01-01 RX ORDER — ONDANSETRON 2 MG/ML
4 INJECTION INTRAMUSCULAR; INTRAVENOUS
Status: DISCONTINUED | OUTPATIENT
Start: 2025-01-01 | End: 2025-01-01 | Stop reason: HOSPADM

## 2025-01-01 RX ORDER — POLYETHYLENE GLYCOL 3350 17 G/17G
1 POWDER, FOR SOLUTION ORAL DAILY
Status: DISCONTINUED | OUTPATIENT
Start: 2025-01-01 | End: 2025-01-01 | Stop reason: HOSPADM

## 2025-01-01 RX ORDER — PREGABALIN 75 MG/1
75 CAPSULE ORAL 3 TIMES DAILY
Status: DISCONTINUED | OUTPATIENT
Start: 2025-01-01 | End: 2025-01-01

## 2025-01-01 RX ORDER — EPHEDRINE SULFATE 50 MG/ML
5 INJECTION, SOLUTION INTRAVENOUS
Status: DISCONTINUED | OUTPATIENT
Start: 2025-01-01 | End: 2025-01-01 | Stop reason: HOSPADM

## 2025-01-01 RX ORDER — DIPHENHYDRAMINE HYDROCHLORIDE 50 MG/ML
12.5 INJECTION INTRAMUSCULAR; INTRAVENOUS
Status: DISCONTINUED | OUTPATIENT
Start: 2025-01-01 | End: 2025-01-01 | Stop reason: HOSPADM

## 2025-01-01 RX ORDER — MORPHINE SULFATE 4 MG/ML
INJECTION INTRAVENOUS
Status: COMPLETED
Start: 2025-01-01 | End: 2025-01-01

## 2025-01-01 RX ORDER — MORPHINE SULFATE 4 MG/ML
2 INJECTION INTRAVENOUS
Status: DISCONTINUED | OUTPATIENT
Start: 2025-01-01 | End: 2025-01-01 | Stop reason: HOSPADM

## 2025-01-01 RX ORDER — HALOPERIDOL 5 MG/ML
1 INJECTION INTRAMUSCULAR
Status: DISCONTINUED | OUTPATIENT
Start: 2025-01-01 | End: 2025-01-01 | Stop reason: HOSPADM

## 2025-01-01 RX ORDER — ONDANSETRON 2 MG/ML
8 INJECTION INTRAMUSCULAR; INTRAVENOUS EVERY 8 HOURS PRN
Status: DISCONTINUED | OUTPATIENT
Start: 2025-01-01 | End: 2025-01-01 | Stop reason: HOSPADM

## 2025-01-01 RX ORDER — BUPRENORPHINE 10 UG/H
1 PATCH TRANSDERMAL
COMMUNITY
Start: 2024-01-01

## 2025-01-01 RX ORDER — ACETAMINOPHEN 325 MG/1
650 TABLET ORAL EVERY 6 HOURS
Status: DISCONTINUED | OUTPATIENT
Start: 2025-01-01 | End: 2025-01-01 | Stop reason: HOSPADM

## 2025-01-01 RX ORDER — GLYCOPYRROLATE 0.2 MG/ML
INJECTION INTRAMUSCULAR; INTRAVENOUS PRN
Status: DISCONTINUED | OUTPATIENT
Start: 2025-01-01 | End: 2025-01-01 | Stop reason: SURG

## 2025-01-01 RX ORDER — LOPERAMIDE HYDROCHLORIDE 2 MG/1
2 CAPSULE ORAL 4 TIMES DAILY PRN
Status: DISCONTINUED | OUTPATIENT
Start: 2025-01-01 | End: 2025-01-01 | Stop reason: HOSPADM

## 2025-01-01 RX ORDER — ATROPINE SULFATE 10 MG/ML
2 SOLUTION/ DROPS OPHTHALMIC EVERY 4 HOURS PRN
Status: DISCONTINUED | OUTPATIENT
Start: 2025-01-01 | End: 2025-01-01 | Stop reason: HOSPADM

## 2025-01-01 RX ORDER — OXYCODONE HCL 5 MG/5 ML
5 SOLUTION, ORAL ORAL
Status: DISCONTINUED | OUTPATIENT
Start: 2025-01-01 | End: 2025-01-01 | Stop reason: HOSPADM

## 2025-01-01 RX ORDER — AMOXICILLIN 250 MG
2 CAPSULE ORAL EVERY EVENING
Status: DISCONTINUED | OUTPATIENT
Start: 2025-01-01 | End: 2025-01-01 | Stop reason: HOSPADM

## 2025-01-01 RX ORDER — ACETAMINOPHEN 325 MG/1
650 TABLET ORAL EVERY 6 HOURS PRN
Status: DISCONTINUED | OUTPATIENT
Start: 2025-01-21 | End: 2025-01-01 | Stop reason: HOSPADM

## 2025-01-01 RX ORDER — MORPHINE SULFATE 4 MG/ML
2 INJECTION INTRAVENOUS
Status: DISCONTINUED | OUTPATIENT
Start: 2025-01-01 | End: 2025-01-01

## 2025-01-01 RX ORDER — OXYCODONE HYDROCHLORIDE 5 MG/1
5 TABLET ORAL
Status: DISCONTINUED | OUTPATIENT
Start: 2025-01-01 | End: 2025-01-01

## 2025-01-01 RX ORDER — LOPERAMIDE HYDROCHLORIDE 2 MG/1
2-4 TABLET ORAL 4 TIMES DAILY PRN
COMMUNITY

## 2025-01-01 RX ORDER — OXYCODONE HYDROCHLORIDE 5 MG/1
2.5 TABLET ORAL
Status: DISCONTINUED | OUTPATIENT
Start: 2025-01-01 | End: 2025-01-01 | Stop reason: HOSPADM

## 2025-01-01 RX ORDER — PREGABALIN 75 MG/1
75 CAPSULE ORAL 2 TIMES DAILY
Status: DISCONTINUED | OUTPATIENT
Start: 2025-01-01 | End: 2025-01-01 | Stop reason: HOSPADM

## 2025-01-01 RX ORDER — OXYCODONE HYDROCHLORIDE 5 MG/1
2.5 TABLET ORAL
Status: DISCONTINUED | OUTPATIENT
Start: 2025-01-01 | End: 2025-01-01

## 2025-01-01 RX ORDER — OXYCODONE HCL 5 MG/5 ML
2.5 SOLUTION, ORAL ORAL
Status: DISCONTINUED | OUTPATIENT
Start: 2025-01-01 | End: 2025-01-01 | Stop reason: HOSPADM

## 2025-01-01 RX ORDER — IBUPROFEN 200 MG
400 TABLET ORAL
COMMUNITY

## 2025-01-01 RX ADMIN — FENTANYL CITRATE 50 MCG: 50 INJECTION, SOLUTION INTRAMUSCULAR; INTRAVENOUS at 17:46

## 2025-01-01 RX ADMIN — OXYCODONE 5 MG: 5 TABLET ORAL at 15:31

## 2025-01-01 RX ADMIN — MORPHINE SULFATE 2 MG: 4 INJECTION, SOLUTION INTRAMUSCULAR; INTRAVENOUS at 10:54

## 2025-01-01 RX ADMIN — MORPHINE SULFATE 2 MG: 4 INJECTION, SOLUTION INTRAMUSCULAR; INTRAVENOUS at 08:20

## 2025-01-01 RX ADMIN — MORPHINE SULFATE 2 MG: 4 INJECTION, SOLUTION INTRAMUSCULAR; INTRAVENOUS at 15:27

## 2025-01-01 RX ADMIN — MORPHINE SULFATE 2 MG: 4 INJECTION, SOLUTION INTRAMUSCULAR; INTRAVENOUS at 11:07

## 2025-01-01 RX ADMIN — MORPHINE SULFATE 2 MG: 4 INJECTION, SOLUTION INTRAMUSCULAR; INTRAVENOUS at 21:36

## 2025-01-01 RX ADMIN — FENTANYL CITRATE 50 MCG: 50 INJECTION, SOLUTION INTRAMUSCULAR; INTRAVENOUS at 18:32

## 2025-01-01 RX ADMIN — MORPHINE SULFATE 2 MG: 4 INJECTION, SOLUTION INTRAMUSCULAR; INTRAVENOUS at 07:40

## 2025-01-01 RX ADMIN — GLYCOPYRROLATE 0.2 MG: 0.2 INJECTION INTRAMUSCULAR; INTRAVENOUS at 09:58

## 2025-01-01 RX ADMIN — MORPHINE SULFATE 2 MG: 4 INJECTION, SOLUTION INTRAMUSCULAR; INTRAVENOUS at 18:57

## 2025-01-01 RX ADMIN — OXYCODONE 5 MG: 5 TABLET ORAL at 09:09

## 2025-01-01 RX ADMIN — OXYCODONE 5 MG: 5 TABLET ORAL at 04:15

## 2025-01-01 RX ADMIN — OXYCODONE 5 MG: 5 TABLET ORAL at 11:37

## 2025-01-01 ASSESSMENT — PAIN SCALES - PAIN ASSESSMENT IN ADVANCED DEMENTIA (PAINAD)
CONSOLABILITY: DISTRACTED OR REASSURED BY VOICE/TOUCH
FACIALEXPRESSION: FACIAL GRIMACING
BREATHING: OCCASIONAL LABORED BREATHING, SHORT PERIOD OF HYPERVENTILATION
BREATHING: NOISY LABORED BREATHING, LONG PERIODS OF HYPERVENTILATION, CHEYNE-STOKES RESPIRATIONS
TOTALSCORE: 8
FACIALEXPRESSION: SMILING OR INEXPRESSIVE
TOTALSCORE: 7
BODYLANGUAGE: RELAXED
FACIALEXPRESSION: FACIAL GRIMACING
BREATHING: NOISY LABORED BREATHING, LONG PERIODS OF HYPERVENTILATION, CHEYNE-STOKES RESPIRATIONS
CONSOLABILITY: NO NEED TO CONSOLE
BREATHING: OCCASIONAL LABORED BREATHING, SHORT PERIOD OF HYPERVENTILATION
FACIALEXPRESSION: FACIAL GRIMACING
BREATHING: NORMAL
TOTALSCORE: 1
TOTALSCORE: 7
BODYLANGUAGE: RIGID, FISTS CLENCHED, KNEES UP, PUSHING/PULLING AWAY, STRIKES OUT
CONSOLABILITY: NO NEED TO CONSOLE
TOTALSCORE: 10
BODYLANGUAGE: RELAXED
CONSOLABILITY: UNABLE TO CONSOLE, DISTRACT OR REASSURE
BODYLANGUAGE: RELAXED
CONSOLABILITY: UNABLE TO CONSOLE, DISTRACT OR REASSURE
TOTALSCORE: 4
NEGVOCALIZATION: REPEATED TROUBLED CALLING OUT, LOUD MOANING/GROANING, CRYING
TOTALSCORE: 1
BREATHING: NORMAL
BODYLANGUAGE: RIGID, FISTS CLENCHED, KNEES UP, PUSHING/PULLING AWAY, STRIKES OUT
FACIALEXPRESSION: SAD, FRIGHTENED, FROWN
BREATHING: OCCASIONAL LABORED BREATHING, SHORT PERIOD OF HYPERVENTILATION
FACIALEXPRESSION: SMILING OR INEXPRESSIVE
BODYLANGUAGE: RIGID, FISTS CLENCHED, KNEES UP, PUSHING/PULLING AWAY, STRIKES OUT
NEGVOCALIZATION: OCCASIONAL MOAN/GROAN, LOW SPEECH, NEGATIVE/DISAPPROVING QUALITY
BODYLANGUAGE: TENSE, DISTRESSED PACING, FIDGETING
BODYLANGUAGE: RIGID, FISTS CLENCHED, KNEES UP, PUSHING/PULLING AWAY, STRIKES OUT
CONSOLABILITY: UNABLE TO CONSOLE, DISTRACT OR REASSURE
NEGVOCALIZATION: REPEATED TROUBLED CALLING OUT, LOUD MOANING/GROANING, CRYING
BREATHING: OCCASIONAL LABORED BREATHING, SHORT PERIOD OF HYPERVENTILATION
NEGVOCALIZATION: OCCASIONAL MOAN/GROAN, LOW SPEECH, NEGATIVE/DISAPPROVING QUALITY
NEGVOCALIZATION: OCCASIONAL MOAN/GROAN, LOW SPEECH, NEGATIVE/DISAPPROVING QUALITY
BODYLANGUAGE: RIGID, FISTS CLENCHED, KNEES UP, PUSHING/PULLING AWAY, STRIKES OUT
NEGVOCALIZATION: REPEATED TROUBLED CALLING OUT, LOUD MOANING/GROANING, CRYING
BREATHING: OCCASIONAL LABORED BREATHING, SHORT PERIOD OF HYPERVENTILATION
BODYLANGUAGE: RIGID, FISTS CLENCHED, KNEES UP, PUSHING/PULLING AWAY, STRIKES OUT
FACIALEXPRESSION: SMILING OR INEXPRESSIVE
NEGVOCALIZATION: REPEATED TROUBLED CALLING OUT, LOUD MOANING/GROANING, CRYING
NEGVOCALIZATION: REPEATED TROUBLED CALLING OUT, LOUD MOANING/GROANING, CRYING
CONSOLABILITY: NO NEED TO CONSOLE
TOTALSCORE: 7
BODYLANGUAGE: RELAXED
FACIALEXPRESSION: SAD, FRIGHTENED, FROWN
BODYLANGUAGE: RELAXED
TOTALSCORE: 9
CONSOLABILITY: NO NEED TO CONSOLE
FACIALEXPRESSION: FACIAL GRIMACING
FACIALEXPRESSION: FACIAL GRIMACING
BREATHING: OCCASIONAL LABORED BREATHING, SHORT PERIOD OF HYPERVENTILATION
FACIALEXPRESSION: SMILING OR INEXPRESSIVE
TOTALSCORE: 0
BREATHING: OCCASIONAL LABORED BREATHING, SHORT PERIOD OF HYPERVENTILATION
CONSOLABILITY: DISTRACTED OR REASSURED BY VOICE/TOUCH
FACIALEXPRESSION: FACIAL GRIMACING
NEGVOCALIZATION: REPEATED TROUBLED CALLING OUT, LOUD MOANING/GROANING, CRYING
FACIALEXPRESSION: FACIAL GRIMACING
BREATHING: OCCASIONAL LABORED BREATHING, SHORT PERIOD OF HYPERVENTILATION
CONSOLABILITY: UNABLE TO CONSOLE, DISTRACT OR REASSURE
TOTALSCORE: 0
BREATHING: NOISY LABORED BREATHING, LONG PERIODS OF HYPERVENTILATION, CHEYNE-STOKES RESPIRATIONS
TOTALSCORE: 9
CONSOLABILITY: DISTRACTED OR REASSURED BY VOICE/TOUCH
TOTALSCORE: 10
BODYLANGUAGE: RIGID, FISTS CLENCHED, KNEES UP, PUSHING/PULLING AWAY, STRIKES OUT
CONSOLABILITY: DISTRACTED OR REASSURED BY VOICE/TOUCH
CONSOLABILITY: DISTRACTED OR REASSURED BY VOICE/TOUCH

## 2025-01-01 ASSESSMENT — COGNITIVE AND FUNCTIONAL STATUS - GENERAL
WALKING IN HOSPITAL ROOM: TOTAL
DRESSING REGULAR UPPER BODY CLOTHING: TOTAL
MOVING FROM LYING ON BACK TO SITTING ON SIDE OF FLAT BED: A LOT
TOILETING: TOTAL
SUGGESTED CMS G CODE MODIFIER MOBILITY: CM
STANDING UP FROM CHAIR USING ARMS: TOTAL
CLIMB 3 TO 5 STEPS WITH RAILING: TOTAL
MOVING TO AND FROM BED TO CHAIR: TOTAL
DAILY ACTIVITIY SCORE: 9
DRESSING REGULAR LOWER BODY CLOTHING: A LOT
HELP NEEDED FOR BATHING: TOTAL
PERSONAL GROOMING: A LOT
MOBILITY SCORE: 8
SUGGESTED CMS G CODE MODIFIER DAILY ACTIVITY: CL
TURNING FROM BACK TO SIDE WHILE IN FLAT BAD: A LOT
EATING MEALS: A LOT

## 2025-01-01 ASSESSMENT — PAIN DESCRIPTION - PAIN TYPE
TYPE: ACUTE PAIN

## 2025-01-01 ASSESSMENT — FIBROSIS 4 INDEX: FIB4 SCORE: 2.39

## 2025-01-16 PROBLEM — S72.001A CLOSED RIGHT HIP FRACTURE, INITIAL ENCOUNTER (HCC): Status: ACTIVE | Noted: 2025-01-01

## 2025-01-16 PROBLEM — Z71.89 GOALS OF CARE, COUNSELING/DISCUSSION: Status: ACTIVE | Noted: 2025-01-01

## 2025-01-16 PROBLEM — N39.0 URINARY TRACT INFECTION: Status: ACTIVE | Noted: 2025-01-01

## 2025-01-16 PROBLEM — R65.10 SIRS (SYSTEMIC INFLAMMATORY RESPONSE SYNDROME) (HCC): Status: ACTIVE | Noted: 2025-01-01

## 2025-01-16 PROBLEM — F03.90 DEMENTIA (HCC): Status: ACTIVE | Noted: 2025-01-01

## 2025-01-16 PROBLEM — R00.1 BRADYCARDIA: Status: ACTIVE | Noted: 2025-01-01

## 2025-01-17 PROBLEM — T82.111A PACEMAKER MALFUNCTION: Status: ACTIVE | Noted: 2025-01-01

## 2025-01-17 PROBLEM — I49.9 DYSRHYTHMIA: Status: ACTIVE | Noted: 2025-01-01

## 2025-01-17 PROBLEM — Z51.5 COMFORT MEASURES ONLY STATUS: Status: ACTIVE | Noted: 2025-01-01

## 2025-01-17 PROBLEM — Z95.0 PACEMAKER: Status: ACTIVE | Noted: 2025-01-01

## 2025-01-17 PROBLEM — J44.9 CHRONIC OBSTRUCTIVE PULMONARY DISEASE (COPD) (HCC): Status: ACTIVE | Noted: 2025-01-01

## 2025-01-17 PROBLEM — I44.2 COMPLETE HEART BLOCK (HCC): Status: ACTIVE | Noted: 2025-01-01

## 2025-01-17 NOTE — ED NOTES
Medication history reviewed via phone with Benhauer Terrence at [facility] ([facility]) [phone].   Med rec is complete  Allergies reviewed.     Patient has not had any outpatient antibiotics in the last 30 days.   Anticoagulants: No    Per Facility Butrans patch was placed yesterday 1/15/25 PM on left sholder, Patient had patch attached to skin when transferred to [facility].    Jordan Durant PhT

## 2025-01-17 NOTE — DISCHARGE PLANNING
Referral Management Team    Hospice referral received via Epic- T will round to obtain choice ASAP  If there are any questions, please contact me directly.

## 2025-01-17 NOTE — ANESTHESIA POSTPROCEDURE EVALUATION
Patient: Inga Galvan    Procedure Summary       Date: 01/17/25 Room / Location: [place] [address] / SURGERY [facility]    Anesthesia Start: 0953 Anesthesia Stop: 1015    Procedure: INSERTION, INTRAMEDULLARY MYKE, FEMUR, PROXIMAL (Right) Diagnosis:     Surgeons: Chiki Charles M.D. Responsible Provider: Mansi Saldivar M.D.    Anesthesia Type: general ASA Status: 3            Final Anesthesia Type: general  Last vitals  BP   Blood Pressure : 130/55    Temp   36.2 °C (97.2 °F)    Pulse   (!) 57   Resp   15    SpO2   91 %      Anesthesia Post Evaluation    Patient location during evaluation: PACU  Patient participation: complete - patient participated  Level of consciousness: awake    Airway patency: patent  Anesthetic complications: no  Respiratory status: acceptable  Comments: Case canceled prior to administration of anesthesia given HR 28 and anticipated intolerance to hemodynamic effects of anesthesics.  Pt will need cardiology evaluation/intervention prior to surgical intervention.              Encounter Notable Events   Notable Event Outcome Phase Comment   Dysrhythmia  Intraprocedure Bradycardia        Nurse Pain Score: 0 (NPRS)

## 2025-01-17 NOTE — CONSULTS
Reason for Consult:  Asked by Dr Dacia Elias M.D. to see this patient with complete hear block pacemaker malfunction  Patient's PCP: Pcp Pt States None    CC:   Chief Complaint   Patient presents with    GLF     Unwitnessed fall at SNF    Hip Pain     Right Hip pain, R femur fx per outside hospital records       HPI: This is 83 yo living at assisted living with Biotronik PPM since 2016 at [place] last checked 3/2024 who presents with fall EKG here show complete heart block and Biotronik pacer interriogation reveals pacer non capture for unknown duration, presumable recently given Cr is normal.  She is sedated and can't answer questions with marked fatigue     Medications / Drug list prior to admission:  No current facility-administered medications on file prior to encounter.     Current Outpatient Medications on File Prior to Encounter   Medication Sig Dispense Refill    buprenorphine (BUTRANS) 10 MCG/HR Place 1 Patch on the skin every 7 days.      pregabalin (LYRICA) 75 MG Cap Take 75 mg by mouth 2 times a day.      ibuprofen (MOTRIN) 200 MG Tab Take 400 mg by mouth 1 time a day as needed for Mild Pain. 2 tablets= 400mg      loperamide (IMODIUM A-D) 2 MG tablet Take 2-4 mg by mouth 4 times a day as needed for Diarrhea.         Current list of administered Medications:    Current Facility-Administered Medications:     MD ALERT...adult comfort care, , Other, PRN, Isabel Chow M.D.    atropine 1 % ophthalmic solution 2 Drop, 2 Drop, Sublingual, Q4HRS PRN, Isabel Chow M.D.    ondansetron (Zofran ODT) dispertab 8 mg, 8 mg, Oral, Q8HRS PRN **OR** ondansetron (Zofran) syringe/vial injection 8 mg, 8 mg, Intravenous, Q8HRS PRN, Isabel Chow M.D.    Pharmacy Consult Request ...Pain Management Review 1 Each, 1 Each, Other, PHARMACY TO DOSE, Isabel Chow M.D.    acetaminophen (Tylenol) tablet 650 mg, 650 mg, Oral, Q6HRS **FOLLOWED BY** [START ON 1/21/2025] acetaminophen (Tylenol) tablet 650 mg, 650 mg, Oral,  Q6HRS PRN, Isabel Chow M.D.    oxyCODONE immediate-release (Roxicodone) tablet 2.5 mg, 2.5 mg, Oral, Q3HRS PRN **OR** oxyCODONE immediate-release (Roxicodone) tablet 5 mg, 5 mg, Oral, Q3HRS PRN **OR** morphine 4 MG/ML injection 2 mg, 2 mg, Intravenous, Q3HRS PRN, Isabel Chow M.D., 2 mg at 01/17/25 1054    pregabalin (Lyrica) capsule 75 mg, 75 mg, Oral, BID, Isabel Chow M.D.    loperamide (Imodium) capsule 2 mg, 2 mg, Oral, 4X/DAY PRN, Isabel Chow M.D.    Past Medical History:   Diagnosis Date    Breast cancer (HCC)     COPD (chronic obstructive pulmonary disease) (HCC)     Dementia (HCC)     Diabetes (HCC)     HTN (hypertension)     Lymphedema     Pacemaker        History reviewed. No pertinent surgical history.    History reviewed. No pertinent family history.  Patient family history was personally reviewed, no pertinent family history to current presentation    Social History     Tobacco Use    Smoking status: Never     Passive exposure: Never    Smokeless tobacco: Never   Vaping Use    Vaping status: Never Used       ALLERGIES:  Allergies   Allergen Reactions    Cephalexin     Dilaudid [Hydromorphone]     Diphtheria Toxoid-Containing Vaccines     Latex     Macrodantin [Nitrofurantoin]     Penicillins     Tetanus Toxoids        Review of systems:  A presentation focused review of symptoms was reviewed with patient. This is reviewed in H&P and PMH. ALL OTHERS reviewed and negative    Physical exam:  Patient Vitals for the past 24 hrs:   BP Temp Temp src Pulse Resp SpO2 Height Weight   01/17/25 1236 139/61 -- -- -- -- -- -- --   01/17/25 1200 -- 36.4 °C (97.5 °F) Temporal (!) 29 13 91 % -- --   01/17/25 1100 -- -- -- (!) 57 -- 91 % -- --   01/17/25 1045 130/55 -- -- (!) 32 15 90 % -- --   01/17/25 1030 (!) 147/57 -- -- (!) 29 15 91 % -- --   01/17/25 1015 125/53 36.2 °C (97.2 °F) Temporal (!) 34 12 99 % -- --   01/17/25 0859 (!) 146/62 36.1 °C (97 °F) Temporal (!) 58 16 99 % -- --   01/17/25 0834 (!)  "154/64 -- -- (!) 28 16 95 % -- --   25 0804 131/66 -- -- (!) 28 19 98 % -- --   25 0740 (!) 154/56 -- -- (!) 29 16 97 % -- --   25 0700 (!) 151/58 -- -- (!) 29 17 95 % -- --   25 0600 (!) 155/57 -- -- (!) 29 20 98 % -- --   25 0500 133/56 -- -- (!) 28 20 95 % -- --   25 0400 115/59 -- -- (!) 30 15 92 % -- --   25 0300 131/72 -- -- (!) 29 20 98 % -- --   25 0200 (!) 148/67 -- -- (!) 29 17 100 % -- --   25 0100 135/57 -- -- (!) 29 15 98 % -- --   25 0000 (!) 142/65 -- -- (!) 29 18 100 % -- --   25 2300 135/87 -- -- (!) 30 16 94 % -- --   25 2200 (!) 168/67 -- -- (!) 29 17 99 % -- --   25 2100 (!) 141/60 36.7 °C (98 °F) -- (!) 33 19 96 % -- --   25 132/61 -- -- (!) 33 20 94 % -- --   25 1902 (!) 140/65 -- -- (!) 30 20 99 % -- --   25 1832 (!) 163/68 -- -- (!) 30 -- 97 % -- --   25 1802 138/62 -- -- 84 -- 95 % -- --   25 1752 (!) 146/79 -- -- 85 -- 92 % -- --   25 1741 137/61 -- -- (!) 31 20 93 % -- --   256 -- -- -- -- -- -- 1.676 m (5' 5.98\") 86.6 kg (191 lb)     General: appears ill lethargic barely aawakens  EYES: no jaundice  HEENT: OP clear   Neck:  No JVD.   CVS:  bradycardic  Resp: Normal respiratory effort,   Abdomen: ND,  Skin: Grossly nothing acute no obvious rashes  Neurological: lethargic Moves all extremities  Extremities:   [  mild edema. No cyanosis.       Data:  Laboratory studies personally reviewed by me:  Recent Results (from the past 24 hours)   EKG (NOW)    Collection Time: 25  5:38 PM   Result Value Ref Range    Report       [facility] Emergency Dept[facility]    Test Date:  2025  Pt Name:    AUGUSTIN KNIGHT             Department:   MRN:        [id number]                      Room:       [facility]  Gender:     Female                       Technician: [id number]  :        1940                   Requested By:GABRIEL SMITH  Order #:    [id number]    "                 Reading MD:    Measurements  Intervals                                Axis  Rate:       63                           P:          0  KY:         0                            QRS:        -73  QRSD:       107                          T:          262  QT:         423  QTc:        434    Interpretive Statements  Atrial fibrillation  Ventricular tachycardia, unsustained  Left axis deviation  Low voltage, extremity and precordial leads  Nonspecific T abnormalities, diffuse leads  Minimal ST elevation, lateral leads  Compared to ECG 03/19/2024 23:58:32  Ventricular tachycardia now present  Left-axis deviation now present  Low QRS voltage  now present  T-wave abnormality now present  ST (T wave) deviation now present  AV dual-paced complex(es) or rhythm no longer present  Ventricular-paced complex(es) or rhythm no longer present     CBC WITH DIFFERENTIAL    Collection Time: 01/16/25  5:50 PM   Result Value Ref Range    WBC 14.1 (H) 4.8 - 10.8 K/uL    RBC 3.45 (L) 4.20 - 5.40 M/uL    Hemoglobin 11.1 (L) 12.0 - 16.0 g/dL    Hematocrit 35.7 (L) 37.0 - 47.0 %    .5 (H) 81.4 - 97.8 fL    MCH 32.2 27.0 - 33.0 pg    MCHC 31.1 (L) 32.2 - 35.5 g/dL    RDW 51.5 (H) 35.9 - 50.0 fL    Platelet Count 101 (L) 164 - 446 K/uL    MPV 12.2 9.0 - 12.9 fL    Neutrophils-Polys 71.20 44.00 - 72.00 %    Lymphocytes 21.30 (L) 22.00 - 41.00 %    Monocytes 6.20 0.00 - 13.40 %    Eosinophils 0.40 0.00 - 6.90 %    Basophils 0.40 0.00 - 1.80 %    Immature Granulocytes 0.50 0.00 - 0.90 %    Nucleated RBC 0.00 0.00 - 0.20 /100 WBC    Neutrophils (Absolute) 10.06 (H) 1.82 - 7.42 K/uL    Lymphs (Absolute) 3.00 1.00 - 4.80 K/uL    Monos (Absolute) 0.87 (H) 0.00 - 0.85 K/uL    Eos (Absolute) 0.05 0.00 - 0.51 K/uL    Baso (Absolute) 0.05 0.00 - 0.12 K/uL    Immature Granulocytes (abs) 0.07 0.00 - 0.11 K/uL    NRBC (Absolute) 0.00 K/uL   PROTHROMBIN TIME (INR)    Collection Time: 01/16/25  5:50 PM   Result Value Ref Range    PT 13.5 12.0  - 14.6 sec    INR 1.03 0.87 - 1.13   APTT    Collection Time: 25  5:50 PM   Result Value Ref Range    APTT 31.4 24.7 - 36.0 sec   URINALYSIS (UA)    Collection Time: 25  6:40 PM    Specimen: Urine   Result Value Ref Range    Color Yellow     Character Cloudy (A)     Specific Gravity 1.014 <1.035    Ph 5.5 5.0 - 8.0    Glucose Negative Negative mg/dL    Ketones Negative Negative mg/dL    Protein 30 (A) Negative mg/dL    Bilirubin Negative Negative    Urobilinogen, Urine 0.2 <=1.0 EU/dL    Nitrite Positive (A) Negative    Leukocyte Esterase Large (A) Negative    Occult Blood Trace (A) Negative    Micro Urine Req Microscopic    URINE MICROSCOPIC (W/UA)    Collection Time: 25  6:40 PM   Result Value Ref Range    WBC 21-50 (A) /hpf    RBC 0-2 0 - 2 /hpf    Bacteria Many (A) None /hpf    Epithelial Cells 3-5 0 - 5 /hpf    Urine Casts 0-2 0 - 2 /lpf   POCT glucose device results    Collection Time: 25  8:24 AM   Result Value Ref Range    POC Glucose, Blood 81 65 - 99 mg/dL   EKG    Collection Time: 25 12:17 PM   Result Value Ref Range    Report       [facility]    Test Date:  2025  Pt Name:    AUGUSTIN KNIGHT             Department: [facility]  MRN:        [id number]                      Room:       [facility]  Gender:     Female                       Technician: [facility]  :        1940                   Requested By:TREVA SERNA  Order #:    [id number]                    Jonas MD: Oziel Alex MD    Measurements  Intervals                                Axis  Rate:       29                           P:          0  DE:         0                            QRS:        48  QRSD:       133                          T:          6  QT:         553  QTc:        384    Interpretive Statements  Pacemaker spikes or artifacts  Junctional rhythm  Right bundle branch block  Baseline wander in lead(s) V2    Electronically Signed On 2025 12:17:49 PST by Oziel lAex MD          Imaging:  [id number]-WITH [id number] VIEW RIGHT   Final Result      Comminuted, displaced and varus angulated intertrochanteric right femur fracture.      CT-OUTSIDE IMAGES-CT CHEST/ABDOMEN/PELVIS   Final Result              EKG tracings personally reviewed by me               All pertinent features of laboratory and imaging reviewed including primary images where applicable      Principal Problem:    Closed right hip fracture, initial encounter (HCC) (POA: Yes)  Active Problems:    Hypertension (POA: Yes)    Dementia (HCC) (POA: Unknown)    Goals of care, counseling/discussion (POA: Unknown)    Bradycardia (POA: Unknown)    SIRS (systemic inflammatory response syndrome) (HCC) (POA: Unknown)    Urinary tract infection (POA: Unknown)    Chronic obstructive pulmonary disease (COPD) (HCC) (POA: Unknown)    Dysrhythmia (POA: Unknown)    Pacemaker (POA: Unknown)    Comfort measures only status (POA: Unknown)    Complete heart block (HCC) (POA: Unknown)    Pacemaker malfunction (POA: Unknown)  Resolved Problems:    * No resolved hospital problems. *      Assessment / Plan:  Pacer noncapture and complete heart block she is symptomatic with lethargy and likely has kidney injury.  Without treatment she is likely to pass soon from this so is appropriate for hospice.  Alternatively she could get a temp wire, micra or upgrade PPM and do hip surgery    Discussed with family and they prefer hospice.  They understand the full ramifications of this decision, if there is a change in status or goals of care please reconsult cardiology      I personally discussed her case with  Dr Dacia Elias M.D.      It is my pleasure to participate in the care of Ms. Galvan.  Please do not hesitate to contact me with questions or concerns.    Harish Lazar MD PhD [facility]  Cardiologist [facility]    1/17/2025    Please note that this dictation was created using voice recognition software. There  may be errors I did not discover before finalizing the note.

## 2025-01-17 NOTE — ED TRIAGE NOTES
Inga Galvan  84 y.o. female  Chief Complaint   Patient presents with    GLF     Unwitnessed fall at     Hip Pain     Right Hip pain, R femur fx per outside hospital records     [facility]. Pt received morphine and norco prior to arrival.     Vitals:    01/16/25 1741   BP: 137/61   Pulse: (!) 31   Resp: 20   SpO2: 93%

## 2025-01-17 NOTE — ASSESSMENT & PLAN NOTE
Unclear etiology, reportedly has pacemaker and suspect some type of intraventricular block.  Per family, pacemaker is not working.  We discussed if they would want any further intervention because it may be possible that the patient could have progressive bradycardia and may arrest; the family expressed understanding of this being possible even before she were to go into surgery in the morning, and they were in agreement with not doing any further workup of this bradycardia at the current time, including EKG.

## 2025-01-17 NOTE — ASSESSMENT & PLAN NOTE
X-ray demonstrates comminuted, displaced and varus angulated intertrochanteric right femur fracture.   Ortho consulted and was suppose to get hip surgery today but patient became bradycardiac and procedure is aborted.   Discussed with son and anya and would like to hold on cardiology and surgery and Son is asking for hospice referral which has been placed. Discussed with  and will provide resource to the family. Hospice evaluation is pending.  Confirmed with son and patient is on comfort measure only status.

## 2025-01-17 NOTE — ASSESSMENT & PLAN NOTE
See SIRS. Suspect UTI vs. Asx bacteriuria, unable to assess in this patient but suspect UTI given SIRS response. No abx per GOC, see GOC

## 2025-01-17 NOTE — ED NOTES
Patient transferred to hospital bed and pillows used for off loading of legs for PU prevention.  Patient resting comfortably, resp even and unlabored, NAD, and no needs at this time.  Fall safety precautions in place per policy.

## 2025-01-17 NOTE — ANESTHESIA TIME REPORT
Anesthesia Start and Stop Event Times       Date Time Event    1/17/2025 0935 Ready for Procedure     0953 Anesthesia Start     1015 Anesthesia Stop          Responsible Staff  01/17/25      Name Role Begin End    Mansi Saldivar M.D. Anesth 0953 1015          Overtime Reason:  no overtime (within assigned shift)    Comments:

## 2025-01-17 NOTE — DISCHARGE PLANNING
Care Transition Team Assessment    RNCM met with patient's son, Cade Sanders, at the [facility] to complete assessment. Cade's spouse, Ana Sanders, is POA.  Pt has DPOA and POLST on file.  Pt has medicare and anthem for medical coverage.  Per Cade, patient has been living at [facility] assisted living [facility] ([address], [place]) for the past year.  Patient has dementia at baseline. Patient was wheelchair bound prior to hospitalization, but she was able to transfer herself.   Per son, patient's monthly income is $3500.  Patient was recently put on comfort care measures---and has only been receiving pain medications at her [facility].  Per son, since patient was unable to have hip surgery, patient will not be able to return to her [facility] in her current state.  Patient's son is interested in hospice.  Per son, [facility] will not go to patient's [facility].  Patient would like to discuss hospice options here locally--possibly group home placement.  RNCM reached out to hospice referral management team, who will meet with Cade today, to discuss options.      Information Source  Orientation Level: Oriented to place, Oriented to situation, Oriented to person  Information Given By: Other (Comments), Relative (Son: Cade Sanders)  Informant's Name: Cade Sanders  Who is responsible for making decisions for patient? : POA  Name(s) of Primary Decision Maker: Ana Sanders    Readmission Evaluation  Is this a readmission?: No    Elopement Risk  Legal Hold: No    Discharge Preparedness  What is your plan after discharge?: Other (comment) (Hospice)  What are your discharge supports?: Child  Prior Functional Level: Needs Assist with Activities of Daily Living, Uses Wheelchair  Difficulity with ADLs:  (Pt was living at an [facility])  Difficulity with IADLs:  (Pt was living at an [facility])    Functional Assesment  Prior Functional Level: Needs Assist with Activities of Daily Living, Uses Wheelchair    Finances  Financial  Barriers to Discharge: No  Prescription Coverage: Yes    Advance Directive  Advance Directive?: DPOA for Health Care, POLST  Durable Power of  Name and Contact : Ana Sanders    Domestic Abuse  Have you ever been the victim of abuse or violence?: No    Psychological Assessment  History of Substance Abuse: None  History of Psychiatric Problems: No (Hx dementia)  Non-compliant with Treatment:  (Hx dementia)  Newly Diagnosed Illness: Yes    Discharge Risks or Barriers  Discharge risks or barriers?: Complex medical needs  Patient risk factors: Cognitive / sensory / physical deficit    Anticipated Discharge Information  Discharge Disposition: D/T to hospice home (50)

## 2025-01-17 NOTE — ANESTHESIA PREPROCEDURE EVALUATION
Case: [id number] Date/Time: 01/17/25 0942    Procedure: INSERTION, INTRAMEDULLARY MYKE, FEMUR, PROXIMAL (Right)    Location: [facility] OR  / SURGERY [facility]    Surgeons: Chiki Charles M.D.            Relevant Problems   PULMONARY   (positive) Chronic obstructive pulmonary disease (COPD) (HCC)      CARDIAC   (positive) Dysrhythmia   (positive) Hypertension   (positive) Pacemaker      ENDO   (positive) Acquired hypothyroidism      Other   (positive) Closed right hip fracture, initial encounter ([facility])   (positive) Dementia ([facility])   Pacemaker dysfunctional per family.    Physical Exam    Airway   Mallampati: II  TM distance: >3 FB  Neck ROM: full       Cardiovascular - normal exam  Rhythm: regular  Rate: abnormal  (-) murmur     Dental - normal exam  (+) upper dentures, lower dentures           Pulmonary - normal exam  Breath sounds clear to auscultation     Abdominal    Neurological - normal exam                   Anesthesia Plan    ASA 3       Plan - general       Airway plan will be ETT          Induction: intravenous    Postoperative Plan: Postoperative administration of opioids is intended.    Pertinent diagnostic labs and testing reviewed    Informed Consent:    Anesthetic plan and risks discussed with patient.    Use of blood products discussed with: patient whom consented to blood products.

## 2025-01-17 NOTE — ED NOTES
Report called to Pre Op RN. Patient scheduled for 1000 surgery and pre op to request transport for around 0900.  Patient sleeping comfortably, resp even and unlabored, NAD, and no needs at this time.  Fall safety precautions in place per policy.

## 2025-01-17 NOTE — PROGRESS NOTES
[facility] Medicine Daily Progress Note    Date of Service  1/17/2025    Chief Complaint  Inga Galvan is a 84 y.o. female admitted 1/16/2025 with right hip fracture.       Hospital Course  No notes on file    84 Y F hx of Dementia, HTN, HLD, Hypothyroidism presented to the hospital for fall with plain film showed right comminuted displaced intertrochanteric fracture of the proximal right femur with varus angulation. Patient was admitted to the hospital on comfort care measure with okay per family to proceed towards hip surgery for comfort measure.       Interval Problem Update  1/17:  Seen and evaluated patient at the bedside. Son is at the bedside.   Son mentioned that patient is on comfort measure at the SNF. She has been getting Butran patches at SNF per son as a pain control.   Plain film in ED showed right comminuted displaced intertrochanteric fracture of the proximal right femur with varus angulation. Patient was suppose to get hip surgery today but patient became bradycardiac with HR's in 20's with EKG concern for 3rd degree block. Procedure is aborted. I spoke with patient son and Una who is the ER physician  in [place] and updated the patient current situation, Family would like a referral for hospice. Son said he wants to hold on surgery and cardiology consult because he states he wants to talk to hospice team for plans of discharge. I placed the hospice and updated the . Patient is currently comfortable, sleeping, bradycardiac with HR's is in 29-58.     Addendum:  Patient son had another discussion with us and now asking for cardiology evaluation and see if patient can be medically optimize from heart standpoint and then get hip repair procedure later for comfort measure. I reached out to cardiology.     Addendum  Patient is transferred to the tele. Patient family mentioned that they do not  wants any heroic life saving measure. They wants to proceed towards the pacemaker  placement but states in the event patient conditions deteriorate okay for the nature to take its course per family. Family don't want drips or ICU level care. Okay per family to monitor patient on tele. Family states that they would like to proceed towards hip repair for comfort and palliative measure  pending cardiac optimization since patient  is in significant amount of pain and has been uncomfortable.   Family stated that it is okay to intubate patient for the procedure purposes only.    I have discussed this patient's plan of care and discharge plan at IDT rounds today with Case Management, Nursing, Nursing leadership, and other members of the IDT team.    Consultants/Specialty  Orthopedics     Code Status  Comfort Care/DNR    Disposition  Not Medically Cleared  I have placed the appropriate orders for post-discharge needs.    Review of Systems  ROS   Unable to obtain due dementia     Physical Exam  Temp:  [36.1 °C (97 °F)-36.7 °C (98 °F)] 36.4 °C (97.5 °F)  Pulse:  [28-85] 29  Resp:  [12-20] 13  BP: (115-168)/(53-87) 139/61  SpO2:  [90 %-100 %] 91 %    Physical Exam  Constitutional:       Appearance: She is ill-appearing.   HENT:      Head: Normocephalic.      Nose: Nose normal.      Mouth/Throat:      Mouth: Mucous membranes are moist.   Cardiovascular:      Rate and Rhythm: Normal rate.   Pulmonary:      Effort: Pulmonary effort is normal.   Abdominal:      General: Abdomen is flat.      Palpations: Abdomen is soft.   Musculoskeletal:         General: Normal range of motion.      Comments: Right hip pain with ROM is limited by pain    Skin:     General: Skin is warm.   Neurological:      General: No focal deficit present.      Mental Status: She is alert. She is disoriented.         Fluids    Intake/Output Summary (Last 24 hours) at 1/17/2025 1321  Last data filed at 1/17/2025 1127  Gross per 24 hour   Intake --   Output 400 ml   Net -400 ml        Laboratory  Recent Labs     01/16/25  1750   WBC 14.1*   RBC  3.45*   HEMOGLOBIN 11.1*   HEMATOCRIT 35.7*   .5*   MCH 32.2   MCHC 31.1*   RDW 51.5*   PLATELETCT 101*   MPV 12.2         Recent Labs     01/16/25  1750   APTT 31.4   INR 1.03               Imaging  DX-HIP-UNILATERAL-WITH PELVIS-1 VIEW RIGHT   Final Result      Comminuted, displaced and varus angulated intertrochanteric right femur fracture.      CT-OUTSIDE IMAGES-CT CHEST/ABDOMEN/PELVIS   Final Result            Assessment/Plan  * Closed right hip fracture, initial encounter ([facility])- (present on admission)  Assessment & Plan  X-ray demonstrates comminuted, displaced and varus angulated intertrochanteric right femur fracture.   Ortho consulted and was suppose to get hip surgery today but patient became bradycardiac and procedure is aborted.   Discussed with son and anya and would like to hold on cardiology and surgery and Son is asking for hospice referral which has been placed. Discussed with  and will provide resource to the family. Hospice evaluation is pending.  Confirmed with son and patient is on comfort measure only status.       Comfort measures only status  Assessment & Plan  Discussed with son and confirmed by the son that patient is on comfort care measures.   On comfort care protocol.     Urinary tract infection  Assessment & Plan  See SIRS. Suspect UTI vs. Asx bacteriuria, unable to assess in this patient but suspect UTI given SIRS response. No abx per [facility], see [facility]    SIRS (systemic inflammatory response syndrome) ([facility])  Assessment & Plan  SIRS criteria identified on my evaluation include:  Tachycardia, with heart rate greater than 90 BPM, Tachypnea, with respirations greater than 20 per minute, and Leukocytosis, with WBC greater than 12,000      Likely in response to right hip fracture and UA with evidence of urinary tract infection.  Noted significant leukocyte Estrace and nitrite on UA obtained by ER provider, family declined chest x-ray to determine if there is any aspiration  pneumonia or other pulmonary infection.  Patient is bradycardic and tachypneic with leukocytosis of 14.1.  However after discussion with the family, antibiotics are not in line with the goals of care for this patient and they would not like proceed with antibiotic therapy at this time.  Will defer to day team to discuss further if family changes their mind on goals of care for this patient.  -comfort care      Bradycardia  Assessment & Plan  Unclear etiology, reportedly has pacemaker and suspect some type of intraventricular block.  Per family, pacemaker is not working.  We discussed if they would want any further intervention because it may be possible that the patient could have progressive bradycardia and may arrest; the family expressed understanding of this being possible even before she were to go into surgery in the morning, and they were in agreement with not doing any further workup of this bradycardia at the current time, including EKG.    Goals of care, counseling/discussion  Assessment & Plan  Discussed goal of care discussion with son and anya would like to proceed toward hospice. They stated to hold on surgery and cardiology consult. Hospice referral has been sent. Evaluation is pending.     Dementia (HCC)  Assessment & Plan  Severe dementia complicated by expressive aphasia. No further treatment based on goals of care. See GOC..    Hypertension- (present on admission)  Assessment & Plan  Likely response to pain from right hip fracture.  Will defer treatment as this is not in line with patient's goals of care.         VTE prophylaxis: comfort care measure only     I have performed a physical exam and reviewed and updated ROS and Plan today (1/17/2025). In review of yesterday's note (1/16/2025), there are no changes except as documented above.    Greater than 51 minutes spent prepping to see patient (e.g. review of tests) obtaining and/or reviewing separately obtained history. Performing a  medically appropriate examination and/ evaluation.  Counseling and educating the patient/family/caregiver.  Ordering medications, tests, or procedures.  Referring and communicating with other health care professionals.  Documenting clinical information in EPIC.  Independently interpreting results and communicating results to patient/family/caregiver.  Care coordination

## 2025-01-17 NOTE — H&P
[facility] Internal Medicine History & Physical Note    Date of Service  1/16/2025    Leif Galvan is a 84 year-old female with advanced dementia complicated by expressive aphasia who presented 1/16/2025 with a fall at her assisted living facility.    The majority of the history is obtained from the patient's son and daughter-in-law as the patient is nonverbal, which is baseline per report the patient's daughter-in-law is her healthcare power of .  The patient has chronic diarrhea of unclear etiology and has regular showers for cleaning at her assisted living facility.  During the shower today, it was reported that the patient had a fall on her right hip for which she was brought to the emergency room.  Imaging in the ER revealed a comminuted, displaced, and varus angulated intertrochanteric right femur fracture. ER provider spoke with Dr. Charles, who is planning to take the patient to the OR at 9 AM on 1/17/2025.  The patient's daughter in law was informed that she should be present for any necessary paperwork early in the morning.    Further discussion was initiated with the patient's daughter-in-law and son regarding the patient's goals of care.  Specifically, it was reported that the patient is comfort care at her outside assisted living facility, and the family, on behalf of the patient, our no longer interested in pursuing any type of medical therapy.  The ER provider reported that the family declined a CT head related to the fall for this patient.  Based on this discussion, we initiated discussion on what treatments the patient would want.  Specifically, the patient's family stated they would not want any further workup for possible infection or antibiotics for treatment.  We discussed that some of the initial laboratory tests indicated that there may be some type of infection, but the family was opposed to treatment at this time as this would not be consistent with the patient's goals  of care given her advanced dementia. They are in line with performing a surgery for the hip fracture with the intention that this may improve the pain and palliate the right hip pain that the patient is experiencing.  Reportedly patient has a pacemaker in place but per the family this is dysfunctional and they have not proceeded with repair as this is not my with patient's goals of care.    A review of systems was performed as above. Past medical history, surgical history, family history, social history, allergies, and medications were reviewed.    Objective  Vitals, labs, and imaging reviewed.    Gen: chronically ll-appearing woman lying in bed on her left side  HEENT: normocephalic, atraumatic   Neck: no tracheal deviation  CV: Bradycardic to the 30s during examination.  Normal rhythm.  Lungs: CTAB  Abd: soft, nontender  Neuro: responsive to voice, reaches out to hold hands with this provider but nonverbal  MSK: Tenderness palpation of the right lateral hip, retracts to pain  Psych: confused affect, pleasant  Skin: warm and dry    Assessment/Plan:  84F with advanced dementia c/b expressive aphasia who presented 1/16/2025 with R comminuted, displaced varus angulated R intertrochanteric femur fracture.    * Closed right hip fracture, initial encounter (HCC)- (present on admission)  Assessment & Plan  X-ray demonstrates comminuted, displaced and varus angulated intertrochanteric right femur fracture.  Ortho following, with plan for OR at 9 AM 1/17/2025.   -pain management, including opiates, ordered  -plan for OR in AM  -NPO at [facility]N  -per discussion with daughter, if were to code in OR would want to remain DNR; will defer further discussion to ortho team    Urinary tract infection  Assessment & Plan  See SIRS. Suspect UTI vs. Asx bacteriuria, unable to assess in this patient but suspect UTI given SIRS response. No abx per GOC, see [facility]    SIRS (systemic inflammatory response syndrome) ([facility])  Assessment & Plan  SIRS  criteria identified on my evaluation include:  Tachycardia, with heart rate greater than 90 BPM, Tachypnea, with respirations greater than 20 per minute, and Leukocytosis, with WBC greater than 12,000      Likely in response to right hip fracture and UA with evidence of urinary tract infection.  Noted significant leukocyte Estrace and nitrite on UA obtained by ER provider, family declined chest x-ray to determine if there is any aspiration pneumonia or other pulmonary infection.  Patient is bradycardic and tachypneic with leukocytosis of 14.1.  However after discussion with the family, antibiotics are not in line with the goals of care for this patient and they would not like proceed with antibiotic therapy at this time.  Will defer to day team to discuss further if family changes their mind on goals of care for this patient.  -comfort care      Bradycardia  Assessment & Plan  Unclear etiology, reportedly has pacemaker and suspect some type of intraventricular block.  Per family, pacemaker is not working.  We discussed if they would want any further intervention because it may be possible that the patient could have progressive bradycardia and may arrest; the family expressed understanding of this being possible even before she were to go into surgery in the morning, and they were in agreement with not doing any further workup of this bradycardia at the current time, including EKG.    Goals of care, counseling/discussion  Assessment & Plan  We restarted the patient's home pregabalin 75 mg 2 times daily, which is the only medication other than opiates and ibuprofen that the patient takes outside of the hospital as a part of a multimodal pain management strategy.  Per the family, they stopped all other medications owing to comfort care.  We had an extensive discussion on goals of care and the family is really only interested in pursuing surgery for the hip fracture as this is acutely causing discomfort for their  mother.  Presented otherwise, they are not interested in any further labs or imaging or even antibiotics to treat a possible infection.  We had a thorough discussion on the difference between CODE STATUS and comfort care.  Specifically, the patient's family said they were only interested in managing discomfort and pain for their mother and would be interested in hospice type care, though this is not available where they are located.  After surgery, the patient's family is interested in going back to the assisted living facility as appropriate.  However, given that the patient is comfort care but does want to proceed with surgery, we discussed that we would do a modified comfort care that would not include heavy use of benzodiazepines or opiates beyond what would be required to control pain specifically.  For this reason, have not ordered scheduled benzodiazepines for agitation or anxiety and will monitor for the signs and treat from a symptom based approach, though this may change overnight as the patient is reportedly often restless and may require some medications but we will use these sparingly and cautiously.  -Consider hospice referral in AM  -Atropine ophthalmic solution as needed  -Zofran as needed  -Converted to comfort care  -Advance diet as tolerated for comfort (will be NPO at [facility] for procedure)  -Will minimize monitoring vitals and defer to nursing per floor guidelines  -holding buprenorphine given opiate need while inpatient    Dementia (HCC)  Assessment & Plan  Severe dementia complicated by expressive aphasia. No further treatment based on goals of care. See GOC..    Hypertension- (present on admission)  Assessment & Plan  Likely response to pain from right hip fracture.  Will defer treatment as this is not in line with patient's goals of care.        The patient's care was discussed with the patient, any present family, and the attending Dr. Stevens. The patient will require at least two  midnights for appropriate medical management, necessitating inpatient admission because of illness severity. The patient will require an inpatient bed on medical service due to hip fracture.    VTE prophylaxis: deferred as comfort care  Diet: ADAT, NPO at [facility] for OR  Code Status: DNR/DNI, comfort  Surrogate Decision Maker: daughter in law Ana  Disposition: pending OR    Isabel Chow MD  Internal Medicine Resident

## 2025-01-17 NOTE — ASSESSMENT & PLAN NOTE
Severe dementia complicated by expressive aphasia. No further treatment based on goals of care. See GOC..

## 2025-01-17 NOTE — ASSESSMENT & PLAN NOTE
Discussed goal of care discussion with son and anya would like to proceed toward hospice. They stated to hold on surgery and cardiology consult. Hospice referral has been sent. Evaluation is pending.

## 2025-01-17 NOTE — OR NURSING
Daughter in law at bedside states top dentures were in last night but no longer in. Possibly in bedding. Patient states they were given to her brother, but patient has dementia and brother has not been in to see patient.    Erythromycin Counseling:  I discussed with the patient the risks of erythromycin including but not limited to GI upset, allergic reaction, drug rash, diarrhea, increase in liver enzymes, and yeast infections.

## 2025-01-17 NOTE — ASSESSMENT & PLAN NOTE
Likely response to pain from right hip fracture.  Will defer treatment as this is not in line with patient's goals of care.

## 2025-01-17 NOTE — ED NOTES
"Bedside report received from off going RN/tech: Claudia, assumed care of patient.  Patient sleeping comfortably. Call light within reach, all needs addressed at this time.       Fall risk interventions in place: Patient's personal possessions are with in their safe reach, Place socks on patient, and Place fall risk sign on patient's door (all applicable per [facility] Fall risk assessment)   Continuous monitoring: Pulse Ox or Blood Pressure  IVF/IV medications: Not Applicable   Oxygen: Room Air  Bedside sitter: Not Applicable   Isolation: Not Applicable    BP (!) 140/65   Pulse (!) 30   Resp 20   Ht 1.676 m (5' 5.98\")   Wt 86.6 kg (191 lb)   SpO2 99%  - RA  "

## 2025-01-17 NOTE — ED PROVIDER NOTES
ED Provider Note  CHIEF COMPLAINT  Chief Complaint   Patient presents with    GLF     Unwitnessed fall at [facility]    Hip Pain     Right Hip pain, R femur fx per outside hospital records       HPI  Inga Galvan is a 84 y.o. female who presents in transfer from [place] after a ground-level fall at her nursing facility.  Patient has a past medical history of fairly severe dementia and is generally A&O x 1.  The fall was unwitnessed and the information available through EMS was scant as the transferring facility did not have much information.  CT of the chest abdomen pelvis was performed at the outside facility which noted a right intertrochanteric hip fracture.  Patient's family members arrived about an hour later and clarified that the patient was being taken to the shower as she has been incontinent of diarrhea recently and she attempted to stand up on her own.  There was no mention of a head strike to the family.  EXTERNAL RECORDS REVIEWED  Reviewed last admission to this facility in March of this year.  ROS  Unable to obtain due to dementia        LIMITATION TO HISTORY   Select: Altered mental status / Confusion  OUTSIDE HISTORIAN(S):  Family son and daughter and EMS transport crew        PAST FAM HISTORY  History reviewed. No pertinent family history.    PAST MEDICAL HISTORY   has a past medical history of Breast cancer (HCC), COPD (chronic obstructive pulmonary disease) (HCC), Dementia (HCC), Diabetes (HCC), HTN (hypertension), Lymphedema, and Pacemaker.    SOCIAL HISTORY  Social History     Tobacco Use    Smoking status: Never     Passive exposure: Never    Smokeless tobacco: Never   Vaping Use    Vaping status: Never Used   Substance and Sexual Activity    Alcohol use: Not on file    Drug use: Not on file    Sexual activity: Not on file       SURGICAL HISTORY  patient denies any surgical history    CURRENT MEDICATIONS  Home Medications    **Home medications have not yet been reviewed for this encounter**    "    Audit from Redirected Encounters    **Home medications have not yet been reviewed for this encounter**          ALLERGIES  Allergies   Allergen Reactions    Cephalexin     Dilaudid [Hydromorphone]     Diphtheria Toxoid-Containing Vaccines     Latex     Macrodantin [Nitrofurantoin]     Penicillins     Tetanus Toxoids        PHYSICAL EXAM  VITAL SIGNS: BP (!) 140/65   Pulse (!) 30   Resp 20   Ht 1.676 m (5' 5.98\")   Wt 86.6 kg (191 lb)   SpO2 99%   BMI 30.85 kg/m²    Gen: Appears tired but alert.  Appears tired but is alert.  HEENT: No signs of trauma, Bilateral external ears normal, Nose normal. Conjunctiva normal, Non-icteric.   Neck: No step-offs or point tenderness noted to midline cervical spine.  Lymphatic: No cervical lymphadenopathy noted.   Cardiovascular: Irregular bradycardia.  Capillary refill less than 3 seconds to all extremities, 2+ distal pulses.  Thorax & Lungs: Poor inspiratory effort but no rales, rhonchi, or wheezing noted.  Abdomen: Bowel sounds normal, Soft, No tenderness, No masses, No pulsatile masses. No Guarding or rebound  Skin: Warm, Dry, No erythema, No rash noted to exposed areas.  Extremities: Intact distal pulses, lymphedema noted to left upper extremity.  Patient is holding both hips and knees in flexion as this appears to be most comfortable for her.  There is tenderness to the left groin and lateral trochanter region.  Neurologic: Appears tired, unable to demonstrate orientation.  No surrounding.  Psychiatric: Affect calm    INITIAL IMPRESSION  Patient arrives in transfer from [place] for a hip fracture.  She is noted to be limited interventions and DNR by POLST that came with her via EMS.  The story is not quite clear about how she fell or whether she hit her head.  She did not get CT imaging of her head or neck at the outside facility but did get the chest abdomen and pelvis.  I feel getting the head and the neck may be necessary given the limited intervention status " as findings might .  Will plan on calling orthopedic surgeon after we repeat a plain film of the right hip and draw labs.  Will await family arrival for clarification of the patient's CODE STATUS and wishes overall.    ED observation? No    LABS  Results for orders placed or performed during the hospital encounter of 01/16/25   CBC WITH DIFFERENTIAL    Collection Time: 01/16/25  5:50 PM   Result Value Ref Range    WBC 14.1 (H) 4.8 - 10.8 K/uL    RBC 3.45 (L) 4.20 - 5.40 M/uL    Hemoglobin 11.1 (L) 12.0 - 16.0 g/dL    Hematocrit 35.7 (L) 37.0 - 47.0 %    .5 (H) 81.4 - 97.8 fL    MCH 32.2 27.0 - 33.0 pg    MCHC 31.1 (L) 32.2 - 35.5 g/dL    RDW 51.5 (H) 35.9 - 50.0 fL    Platelet Count 101 (L) 164 - 446 K/uL    MPV 12.2 9.0 - 12.9 fL    Neutrophils-Polys 71.20 44.00 - 72.00 %    Lymphocytes 21.30 (L) 22.00 - 41.00 %    Monocytes 6.20 0.00 - 13.40 %    Eosinophils 0.40 0.00 - 6.90 %    Basophils 0.40 0.00 - 1.80 %    Immature Granulocytes 0.50 0.00 - 0.90 %    Nucleated RBC 0.00 0.00 - 0.20 /100 WBC    Neutrophils (Absolute) 10.06 (H) 1.82 - 7.42 K/uL    Lymphs (Absolute) 3.00 1.00 - 4.80 K/uL    Monos (Absolute) 0.87 (H) 0.00 - 0.85 K/uL    Eos (Absolute) 0.05 0.00 - 0.51 K/uL    Baso (Absolute) 0.05 0.00 - 0.12 K/uL    Immature Granulocytes (abs) 0.07 0.00 - 0.11 K/uL    NRBC (Absolute) 0.00 K/uL   PROTHROMBIN TIME (INR)    Collection Time: 01/16/25  5:50 PM   Result Value Ref Range    PT 13.5 12.0 - 14.6 sec    INR 1.03 0.87 - 1.13   APTT    Collection Time: 01/16/25  5:50 PM   Result Value Ref Range    APTT 31.4 24.7 - 36.0 sec   URINALYSIS (UA)    Collection Time: 01/16/25  6:40 PM    Specimen: Urine   Result Value Ref Range    Color Yellow     Character Cloudy (A)     Specific Gravity 1.014 <1.035    Ph 5.5 5.0 - 8.0    Glucose Negative Negative mg/dL    Ketones Negative Negative mg/dL    Protein 30 (A) Negative mg/dL    Bilirubin Negative Negative    Urobilinogen, Urine 0.2 <=1.0 EU/dL     Nitrite Positive (A) Negative    Leukocyte Esterase Large (A) Negative    Occult Blood Trace (A) Negative    Micro Urine Req Microscopic    URINE MICROSCOPIC (W/UA)    Collection Time: 01/16/25  6:40 PM   Result Value Ref Range    WBC 21-50 (A) /hpf    RBC 0-2 0 - 2 /hpf    Bacteria Many (A) None /hpf    Epithelial Cells 3-5 0 - 5 /hpf    Urine Casts 0-2 0 - 2 /lpf     I have independently interpreted the diagnostic imaging associated with this visit and am waiting the final reading from the radiologist.   My preliminary interpretation is a follows: 2 view right hip and AP pelvis: Comminuted intertrochanteric right hip fracture.  RADIOLOGY  DX-HIP-UNILATERAL-WITH PELVIS-1 VIEW RIGHT   Final Result      Comminuted, displaced and varus angulated intertrochanteric right femur fracture.      CT-OUTSIDE IMAGES-CT CHEST/ABDOMEN/PELVIS   Final Result            ASSESSMENT, COURSE AND PLAN  Care Narrative:   6:27 PM  Case discussed with the patient's daughter and son who have arrived.  They state that the POLST sent from the nursing facility indicated limited dimensions however she has recently been made comfort care by her primary care provider and taken off most of her medications aside from the ones to keep her comfortable.  This is due to advanced dementia and progressing debility overall.  I discussed that operative fixation of a hip fracture can be considered comfort care as it controls pain.  They are optimal to plan with surgical intervention at this time so I will discuss the case with the orthopedic surgeon.  Of note, I did have CT imaging of the head and neck ordered, how after discussion with the patient's family, they would like them canceled as even if acute pathology was found, there would be no intervention performed.  They also note that the care staff was with the patient and she did not hit her head when she fell.    6:51 PM  Case discussed with Dr. Charles, orthopedic surgery, who was able to review  imaging results and noted the patient is appropriate for a surgical repair.  He indicated that the patient would most likely be taken to the OR on 9 AM tomorrow morning and that if the daughter, who is the POA, could be at the patient's room by that time, she can accompany the patient to the preop area well he will get consent from her.  Case discussed with the hospitalist who will evaluate the patient in the emergency department.              I have discussed management of the patient with the following physicians and XIMENA's: Dr. Charles, orthopedic surgeon, hospitalist    Escalation of care considered, and ultimately not performed: None    Barriers to care at this time, including but not limited to: Dementia.     Decision tools and Rx drugs considered including, but not limited to : None    Discussion of management with other QHP or appropriate source(s): None        FINAL IMPRESSION  1. Closed fracture of right hip, initial encounter (HCC)        Electronically signed by: Vic Torres M.D., 1/16/2025 5:40 PM

## 2025-01-17 NOTE — ED NOTES
Transport at bedside for transfer of pt to Pre-op.   Family with pt on transfer.  No belongings left in the room.

## 2025-01-17 NOTE — ASSESSMENT & PLAN NOTE
Discussed with son and confirmed by the son that patient is on comfort care measures.   On comfort care protocol.

## 2025-01-17 NOTE — ASSESSMENT & PLAN NOTE
SIRS criteria identified on my evaluation include:  Tachycardia, with heart rate greater than 90 BPM, Tachypnea, with respirations greater than 20 per minute, and Leukocytosis, with WBC greater than 12,000      Likely in response to right hip fracture and UA with evidence of urinary tract infection.  Noted significant leukocyte Estrace and nitrite on UA obtained by ER provider, family declined chest x-ray to determine if there is any aspiration pneumonia or other pulmonary infection.  Patient is bradycardic and tachypneic with leukocytosis of 14.1.  However after discussion with the family, antibiotics are not in line with the goals of care for this patient and they would not like proceed with antibiotic therapy at this time.  Will defer to day team to discuss further if family changes their mind on goals of care for this patient.  -comfort care

## 2025-01-17 NOTE — CONSULTS
Orthopaedic Surgery Consult    Date: 1/17/2025     History Present Illness    Inga Galvan 84 y.o. female with advanced dementia with advanced dementia and at end-of-life care facility who presents status post ground-level fall with right hip pain and deformity.  She is conversant but generally A and O x 1 per report.  Patient states that she is in significant pain.  Patient has been nonambulatory for years per her daughter-in-law.    Past Medical History:  Active Ambulatory Problems     Diagnosis Date Noted    Hypertension 03/20/2024    Acquired hypothyroidism 03/20/2024    Dyslipidemia 03/20/2024     Resolved Ambulatory Problems     Diagnosis Date Noted    Expressive aphasia 03/20/2024    Hypertensive urgency 03/20/2024     Past Medical History:   Diagnosis Date    Breast cancer (HCC)     COPD (chronic obstructive pulmonary disease) (HCC)     Dementia (HCC)     Diabetes (HCC)     HTN (hypertension)     Lymphedema     Pacemaker        Past Surgical History:  History reviewed. No pertinent surgical history.    Medications:  Medications Prior to Admission   Medication Sig Dispense Refill Last Dose/Taking    buprenorphine (BUTRANS) 10 MCG/HR Place 1 Patch on the skin every 7 days.   1/15/2025 Evening    pregabalin (LYRICA) 75 MG Cap Take 75 mg by mouth 2 times a day.   1/16/2025 Morning    ibuprofen (MOTRIN) 200 MG Tab Take 400 mg by mouth 1 time a day as needed for Mild Pain. 2 tablets= 400mg   Unknown    loperamide (IMODIUM A-D) 2 MG tablet Take 2-4 mg by mouth 4 times a day as needed for Diarrhea.   Unknown     Current Facility-Administered Medications   Medication Dose Route Frequency Provider Last Rate Last Admin    [MAR Hold] MD ALERT...adult comfort care   Other PRN Isabel Chow M.D.        [MAR Hold] atropine 1 % ophthalmic solution 2 Drop  2 Drop Sublingual Q4HRS PRN Isabel Chow M.D.        [MAR Hold] ondansetron (Zofran ODT) dispertab 8 mg  8 mg Oral Q8HRS PRN Isabel Chow M.D.        Or     [MAR Hold] ondansetron (Zofran) syringe/vial injection 8 mg  8 mg Intravenous Q8HRS PRN Isabel Chow M.D.        [MAR Hold] Pharmacy Consult Request ...Pain Management Review 1 Each  1 Each Other PHARMACY TO DOSE Isabel Chow M.D.        [MAR Hold] acetaminophen (Tylenol) tablet 650 mg  650 mg Oral Q6HRS Isabel Chow M.D.        Followed by    [MAR Hold] acetaminophen (Tylenol) tablet 650 mg  650 mg Oral Q6HRS PRN Isabel Chow M.D.        [MAR Hold] oxyCODONE immediate-release (Roxicodone) tablet 2.5 mg  2.5 mg Oral Q3HRS PRN Isabel Chow M.D.        Or    [MAR Hold] oxyCODONE immediate-release (Roxicodone) tablet 5 mg  5 mg Oral Q3HRS PRN Isabel Chow M.D.        Or    [MAR Hold] morphine 4 MG/ML injection 2 mg  2 mg Intravenous Q3HRS PRN Isabel Chow M.D.   2 mg at 01/17/25 0740    [MAR Hold] pregabalin (Lyrica) capsule 75 mg  75 mg Oral BID Isabel Chow M.D.        [MAR Hold] loperamide (Imodium) capsule 2 mg  2 mg Oral 4X/DAY PRN Isabel Chow M.D.           Allergies:  Cephalexin, Dilaudid [hydromorphone], Diphtheria toxoid-containing vaccines, Latex, Macrodantin [nitrofurantoin], Penicillins, and Tetanus toxoids    Family History:  History reviewed. No pertinent family history.    Social History:  Social History     Socioeconomic History    Marital status:    Tobacco Use    Smoking status: Never     Passive exposure: Never    Smokeless tobacco: Never   Vaping Use    Vaping status: Never Used       ROS:  Complete ROS performed. ROS otherwise negative except for pertinent positives, negatives noted above in HPI.    Physical Exam     Vitals:    01/17/25 0859   BP: (!) 146/62   Pulse: (!) 58   Resp: 16   Temp: 36.1 °C (97 °F)   SpO2: 99%       Physical Exam  General: NAD    Lungs: No increased work of breathing  Heart: Regular rate by palpation of peripheral pulse    RLE:  Skin intact  Limb rotated and shortened  Pain with manipulation  Grossly NVID    Labs, Imaging   Imaging:  "  DX-HIP-UNILATERAL-WITH PELVIS-1 VIEW RIGHT   Final Result      Comminuted, displaced and varus angulated intertrochanteric right femur fracture.      CT-OUTSIDE IMAGES-CT CHEST/ABDOMEN/PELVIS   Final Result      DX-PORTABLE FLUORO > 1 HOUR    (Results Pending)   DX-HIP-UNILATERAL-W/O PELVIS-2/3 VIEWS RIGHT    (Results Pending)       Laboratory Values  Recent Labs     01/16/25  1750   WBC 14.1*   RBC 3.45*   HEMOGLOBIN 11.1*   HEMATOCRIT 35.7*   .5*   MCH 32.2   MCHC 31.1*   RDW 51.5*   PLATELETCT 101*   MPV 12.2     No results for input(s): \"SODIUM\", \"POTASSIUM\", \"CHLORIDE\", \"CO2\", \"GLUCOSE\", \"BUN\", \"CPKTOTAL\" in the last 72 hours.  Recent Labs     01/16/25  1750   APTT 31.4   INR 1.03       Assessment   Inga Hartmanniot 84 y.o. female with advanced dementia with advanced dementia and at end-of-life care facility who presents with R comminuted IT fracture. Discussed pros/cons of operative fixation with the family. Goals are stabilization of the limb to improve her comfort, pain control, and assist in transfers and nursing care.    Plan     Discussed clinical, physical, and imaging findings with patient and POA (daughter in law). Surgery consists of IMN R hip. I discussed the clinical, physical, and imaging findings with patient and POA and I answered any questions.    Risks, benefits, and alternatives discussed with patient and POA. Risks for surgery may include bleeding, infection, pain, nonunion, malunion, fracture, hardware failure, persistent dysfunction, damage to surrounding neurovascular structures, anesthesia complications, operative failure, stroke, DVT, or death. Patient's POA agrees to proceed with surgery. Informed consent obtained.      Signed:  Chiki Charles M.D., MD  1/17/2025 9:39 AM    "

## 2025-01-17 NOTE — OP REPORT
Operative Report    Date of Surgery: 1/17/2025    Operating Surgeon:   Primary: Chiki Charles M.D.  Fellow: Gerald Case M.D.      Assistant:  Gerald Case MD - ortho trauma fellow    Preoperative diagnosis:  Right intertrochanteric femur fracture    Postoperative diagnosis:   Right intertrochanteric femur fracture    Procedure:  None - case aborted prior to anesthesia      Indications:  Inga Galvan 84 y.o. female with advanced dementia with advanced dementia and at end-of-life care facility who presents with R comminuted IT fracture. Discussed pros/cons of operative fixation with the family. Goals are stabilization of the limb to improve her comfort, pain control, and assist in transfers and nursing care.     Patient with bradycardia and nonoperative pacemaker. Risks of proceeding despite this had been discussed with the patient's family, as documented in hospitalist's note. Discussed clinical, physical, and imaging findings with patient and POA (daughter in law). Surgery consists of IMN R hip. I discussed the clinical, physical, and imaging findings with patient and POA and I answered any questions.     Risks, benefits, and alternatives discussed with patient and POA. Risks for surgery may include bleeding, infection, pain, nonunion, malunion, fracture, hardware failure, persistent dysfunction, damage to surrounding neurovascular structures, anesthesia complications, operative failure, stroke, DVT, or death. Patient's POA agrees to proceed with surgery. Informed consent obtained.    Description:    Patient was taken to the operating room. Prior to anesthesia, the patient demonstrated continued bradycardia into the mid 20's. Anesthesia expressed their concern about proceeding, and the possibility of the patient coding if placed under anesthesia. Considering this imminent risk, a mutual decision was made not to proceed. The patient was taken out of the operating room.    Postoperative Plan:  -  Discussion held with patient's POA regarding options moving forward. Non-operative management is an option for end of life care. Per the orthopedic literature, there is a significant chance that the patient would  within the next 3 months. Quality of death with palliative non-operative management is reasonable for many patients. A mutual decision was made to evaluate the patient's options further after cardiology evaluation. As such, a cardiology consult will be obtained for any recommendations that could optimize the patient and reduce her cardiac risk with anesthesia.

## 2025-01-17 NOTE — ED NOTES
Pt medicated per MAR for pain.   All needs met at this time.   Call light in reach, Family at bedside.

## 2025-01-18 NOTE — DISCHARGE PLANNING
Referral Management Team    This RN met with son Cade and patient at bedside. Dr. King also came to bedside to confirm family would still like to proceed with pacemaker replacement and hip repair. Per Cade family is in agreement to proceed with procedures.    This RN provided education on hospice including philosophy, goals of care, individualized care plan, and core CMS requirements hospice agencies provide. Choice form provided and explained. Placement options discussed along with patient's current [facility] in [place], [place] which is able to provide comfort care services but not hospice. Son and family live in [place] but decided placement in [place], [place] was best option financially and patient's brother lives there.     This RN explained to Cade that should something change and they prefer patient stay local or would like to take patient home with hospice, Hospice RMT will be available on Monday 1/20 but that case will be deferred back to unit CM.    1087-This RN updated Nora ONEILL CM regarding family wanting patient to dc back to [facility] in [place] for comfort care.    RMT is available to assist should anything change. Please contact RMT Monday 1/20

## 2025-01-18 NOTE — PROGRESS NOTES
[facility] Medicine Daily Progress Note    Date of Service  1/18/2025    Chief Complaint  Inga Galvan is a 84 y.o. female admitted 1/16/2025 with right hip fracture.       Hospital Course  No notes on file    84 Y F hx of Dementia, HTN, HLD, Hypothyroidism presented to the hospital for fall with plain film showed right comminuted displaced intertrochanteric fracture of the proximal right femur with varus angulation. Patient was admitted to the hospital on comfort care measure with okay per family to proceed towards hip surgery for comfort measure.       Interval Problem Update  1/17:  Seen and evaluated patient at the bedside. Son is at the bedside.   Son mentioned that patient is on comfort measure at the SNF. She has been getting Butran patches at SNF per son as a pain control.   Plain film in ED showed right comminuted displaced intertrochanteric fracture of the proximal right femur with varus angulation. Patient was suppose to get hip surgery today but patient became bradycardiac with HR's in 20's with EKG concern for 3rd degree block. Procedure is aborted. I spoke with patient son and Una who is the ER physician  in [place] and updated the patient current situation, Family would like a referral for hospice. Son said he wants to hold on surgery and cardiology consult because he states he wants to talk to hospice team for plans of discharge. I placed the hospice and updated the . Patient is currently comfortable, sleeping, bradycardiac with HR's is in 29-58.     Addendum:  Patient son had another discussion with us and now asking for cardiology evaluation and see if patient can be medically optimize from heart standpoint and then get hip repair procedure later for comfort measure. I reached out to cardiology.     Addendum  Patient is transferred to the tele. Patient family mentioned that they do not  wants any heroic life saving measure. They wants to proceed towards the pacemaker  placement but states in the event patient conditions deteriorate okay for the nature to take its course per family. Family don't want drips or ICU level care. Okay per family to monitor patient on tele. Family states that they would like to proceed towards hip repair for comfort and palliative measure  pending cardiac optimization since patient  is in significant amount of pain and has been uncomfortable.   Family stated that it is okay to intubate patient for the procedure purposes only.    1/18:  Evaluated patient at the bedside along with family. On comfort care. Referral to hospice has been sent per family request. Family didn't want to proceed towards pacemaker and hip surgery after discussion with cardiology. Discussed with  and referral has been sent for hospice locally.     I have discussed this patient's plan of care and discharge plan at IDT rounds today with Case Management, Nursing, Nursing leadership, and other members of the IDT team.    Consultants/Specialty  Orthopedics     Code Status  Comfort Care/DNR    Disposition  Not Medically Cleared  I have placed the appropriate orders for post-discharge needs.    Review of Systems  ROS   Unable to obtain due dementia     Physical Exam  Temp:  [36.2 °C (97.1 °F)-36.6 °C (97.9 °F)] 36.2 °C (97.1 °F)  Pulse:  [41-69] 51  Resp:  [12-14] 12  BP: ()/(28-38) 98/38  SpO2:  [92 %-96 %] 94 %    Physical Exam  Constitutional:       Appearance: She is ill-appearing.   HENT:      Head: Normocephalic.      Nose: Nose normal.      Mouth/Throat:      Mouth: Mucous membranes are moist.   Cardiovascular:      Rate and Rhythm: Normal rate.   Pulmonary:      Effort: Pulmonary effort is normal.   Abdominal:      General: Abdomen is flat.      Palpations: Abdomen is soft.   Musculoskeletal:         General: Normal range of motion.      Comments: Right hip pain with ROM is limited by pain    Skin:     General: Skin is warm.   Neurological:      General: No focal  deficit present.      Mental Status: She is alert. She is disoriented.         Fluids    Intake/Output Summary (Last 24 hours) at 1/18/2025 1324  Last data filed at 1/18/2025 0435  Gross per 24 hour   Intake --   Output 275 ml   Net -275 ml        Laboratory  Recent Labs     01/16/25  1750   WBC 14.1*   RBC 3.45*   HEMOGLOBIN 11.1*   HEMATOCRIT 35.7*   .5*   MCH 32.2   MCHC 31.1*   RDW 51.5*   PLATELETCT 101*   MPV 12.2         Recent Labs     01/16/25  1750   APTT 31.4   INR 1.03               Imaging  DX-HIP-UNILATERAL-WITH PELVIS-1 VIEW RIGHT   Final Result      Comminuted, displaced and varus angulated intertrochanteric right femur fracture.      CT-OUTSIDE IMAGES-CT CHEST/ABDOMEN/PELVIS   Final Result            Assessment/Plan  * Closed right hip fracture, initial encounter ([facility])- (present on admission)  Assessment & Plan  X-ray demonstrates comminuted, displaced and varus angulated intertrochanteric right femur fracture.   Ortho consulted and was suppose to get hip surgery today but patient became bradycardiac and procedure is aborted.   Discussed with son and anya and would like to hold on cardiology and surgery and Son is asking for hospice referral which has been placed. Discussed with  and will provide resource to the family. Hospice evaluation is pending.  Confirmed with son and patient is on comfort measure only status.       Comfort measures only status  Assessment & Plan  Discussed with son and confirmed by the son that patient is on comfort care measures.   On comfort care protocol.     Urinary tract infection  Assessment & Plan  See SIRS. Suspect UTI vs. Asx bacteriuria, unable to assess in this patient but suspect UTI given SIRS response. No abx per GOC, see [facility]    SIRS (systemic inflammatory response syndrome) ([facility])  Assessment & Plan  SIRS criteria identified on my evaluation include:  Tachycardia, with heart rate greater than 90 BPM, Tachypnea, with respirations greater  than 20 per minute, and Leukocytosis, with WBC greater than 12,000      Likely in response to right hip fracture and UA with evidence of urinary tract infection.  Noted significant leukocyte Estrace and nitrite on UA obtained by ER provider, family declined chest x-ray to determine if there is any aspiration pneumonia or other pulmonary infection.  Patient is bradycardic and tachypneic with leukocytosis of 14.1.  However after discussion with the family, antibiotics are not in line with the goals of care for this patient and they would not like proceed with antibiotic therapy at this time.  Will defer to day team to discuss further if family changes their mind on goals of care for this patient.  -comfort care      Bradycardia  Assessment & Plan  Unclear etiology, reportedly has pacemaker and suspect some type of intraventricular block.  Per family, pacemaker is not working.  We discussed if they would want any further intervention because it may be possible that the patient could have progressive bradycardia and may arrest; the family expressed understanding of this being possible even before she were to go into surgery in the morning, and they were in agreement with not doing any further workup of this bradycardia at the current time, including EKG.    Goals of care, counseling/discussion  Assessment & Plan  Discussed goal of care discussion with son and anya would like to proceed toward hospice. They stated to hold on surgery and cardiology consult. Hospice referral has been sent. Evaluation is pending.     Dementia (HCC)  Assessment & Plan  Severe dementia complicated by expressive aphasia. No further treatment based on goals of care. See GOC..    Hypertension- (present on admission)  Assessment & Plan  Likely response to pain from right hip fracture.  Will defer treatment as this is not in line with patient's goals of care.         VTE prophylaxis: comfort care measure only     I have performed a physical  exam and reviewed and updated ROS and Plan today (1/18/2025). In review of yesterday's note (1/17/2025), there are no changes except as documented above.    Greater than 51 minutes spent prepping to see patient (e.g. review of tests) obtaining and/or reviewing separately obtained history. Performing a medically appropriate examination and/ evaluation.  Counseling and educating the patient/family/caregiver.  Ordering medications, tests, or procedures.  Referring and communicating with other health care professionals.  Documenting clinical information in EPIC.  Independently interpreting results and communicating results to patient/family/caregiver.  Care coordination

## 2025-01-18 NOTE — PROGRESS NOTES
1610- Patient transferred to  for telemetry monitoring.  1715- Patient transferred back to [place] [id number] as plan of care changed.

## 2025-01-18 NOTE — DISCHARGE PLANNING
Case Management Discharge Planning    Admission Date: 1/16/2025  GMLOS: 2.8  ALOS: 2    6-Clicks ADL Score: 9  6-Clicks Mobility Score: 8      Anticipated Discharge Dispo: Discharge Disposition: D/T to hospice home (50)      Action(s) Taken: Choice obtained and Referral(s) sent    Escalations Completed: None    Medically Clear: Yes    Next Steps: RNCM met with family at bedside. They stated they do live in town here and would like to pursue hospice here. RNCM discussed various hospice agencies and family settled on [facility]. RNCM will send referral.     Barriers to Discharge: Outpatient referrals pending    Is the patient up for discharge tomorrow: No

## 2025-01-18 NOTE — CARE PLAN
The patient is Stable - Low risk of patient condition declining or worsening    Shift Goals  Clinical Goals: pain control, skin integrity, comfort  Patient Goals: EVER  Family Goals: not present    Progress made toward(s) clinical / shift goals:    Problem: Pain - Standard  Goal: Alleviation of pain or a reduction in pain to the patient’s comfort goal  Outcome: Progressing  Flowsheets  Taken 1/17/2025 2322  Non Verbal Scale:   Sleeping   Calm   Unlabored Breathing  Taken 1/17/2025 2130  PAINAD Score: 0       Patient is not progressing towards the following goals: n/a

## 2025-01-18 NOTE — RESPIRATORY CARE
COPD EDUCATION by COPD CLINICAL EDUCATOR  1/18/2025 at 6:50 AM by Pamela Archer, NEISHA     Patient reviewed by COPD education team. Patient does not qualify for the COPD program because no active respiratory medications, not PFT and oriented to self at baseline.

## 2025-01-18 NOTE — PROGRESS NOTES
HLOC from Ortho. Report received from [facility] [place]. Pt transported to floor on zoll with ACT. Pt awaiting clean bed on T7. Pacemaker being interrogated.

## 2025-01-19 NOTE — PROGRESS NOTES
Pt is sleeping comfortably, no signs of pain at this time, respiration even and unlabored, fall precautions in place

## 2025-01-19 NOTE — CARE PLAN
The patient is Unstable - High likelihood or risk of patient condition declining or worsening    Shift Goals  Clinical Goals: comfort, pain control  Patient Goals: EVER  Family Goals: NA    Progress made toward(s) clinical / shift goals:      Problem: Pain - Standard  Goal: Alleviation of pain or a reduction in pain to the patient’s comfort goal  Description: Target End Date:  Prior to discharge or change in level of care    Document on Vitals flowsheet    1.  Document pain using the appropriate pain scale per order or unit policy  2.  Educate and implement non-pharmacologic comfort measures (i.e. relaxation, distraction, massage, cold/heat therapy, etc.)  3.  Pain management medications as ordered  4.  Reassess pain after pain med administration per policy  5.  If opiods administered assess patient's response to pain medication is appropriate per POSS sedation scale  6.  Follow pain management plan developed in collaboration with patient and interdisciplinary team (including palliative care or pain specialists if applicable)  1/19/2025 0141 by Anastasiia Felipe R.N.  Outcome: Progressing     Patient is not progressing towards the following goals:

## (undated) DEVICE — TUBING CLEARLINK DUO-VENT - C-FLO (48EA/CA)

## (undated) DEVICE — LACTATED RINGERS INJ 1000 ML - (14EA/CA 60CA/PF)

## (undated) DEVICE — LOCKING DRILL 4.2X360MM

## (undated) DEVICE — GOWN SURGEONS X-LARGE - DISP. (30/CA)

## (undated) DEVICE — GLOVE BIOGEL PI ORTHO SZ 7.5 PF LF (40PR/BX)

## (undated) DEVICE — CANISTER SUCTION 3000ML MECHANICAL FILTER AUTO SHUTOFF MEDI-VAC NONSTERILE LF DISP (40EA/CA)

## (undated) DEVICE — SET EXTENSION WITH 2 PORTS (48EA/CA) ***PART #2C8610 IS A SUBSTITUTE*****

## (undated) DEVICE — GLOVE SZ 7.5 BIOGEL PI MICRO - PF LF (50PR/BX)

## (undated) DEVICE — PIN PRECISION TM TAPER D3.2/3.9 X 450MM (1EA): Type: IMPLANTABLE DEVICE | Status: NON-FUNCTIONAL

## (undated) DEVICE — STAPLER 35MM SKIN WIDE ROTATING HEAD (6EA/BX)

## (undated) DEVICE — GLOVE BIOGEL PI INDICATOR SZ 7.5 SURGICAL PF LF -(50/BX 4BX/CA)

## (undated) DEVICE — PACK MAJOR ORTHO - (2EA/CA)

## (undated) DEVICE — SENSOR OXIMETER ADULT SPO2 RD SET (20EA/BX)

## (undated) DEVICE — SUTURE GENERAL

## (undated) DEVICE — SUTURE 2-0 VICRYL PLUS CT-1 36 (36PK/BX)"

## (undated) DEVICE — GLOVE BIOGEL PI INDICATOR SZ 7.0 SURGICAL PF LF - (50/BX 4BX/CA)

## (undated) DEVICE — K-WIRE 3MM X 285MM (1EA): Type: IMPLANTABLE DEVICE | Status: NON-FUNCTIONAL

## (undated) DEVICE — ELECTRODE DUAL RETURN W/ CORD - (50/PK)

## (undated) DEVICE — GLOVE SZ 7 BIOGEL PI MICRO - PF LF (50PR/BX 4BX/CA)

## (undated) DEVICE — DRAPE IOBAN LARGE 2 INCISE FILM (5EA/CA)

## (undated) DEVICE — DRAPE 36X28IN RAD CARM BND BG - (25/CA) O

## (undated) DEVICE — SET LEADWIRE 5 LEAD BEDSIDE DISPOSABLE ECG (1SET OF 5/EA)

## (undated) DEVICE — SUCTION INSTRUMENT YANKAUER BULBOUS TIP W/O VENT (50EA/CA)